# Patient Record
Sex: MALE | NOT HISPANIC OR LATINO | Employment: FULL TIME | URBAN - METROPOLITAN AREA
[De-identification: names, ages, dates, MRNs, and addresses within clinical notes are randomized per-mention and may not be internally consistent; named-entity substitution may affect disease eponyms.]

---

## 2021-11-11 ENCOUNTER — TRANSFERRED RECORDS (OUTPATIENT)
Dept: HEALTH INFORMATION MANAGEMENT | Facility: CLINIC | Age: 61
End: 2021-11-11

## 2022-12-21 ENCOUNTER — TRANSFERRED RECORDS (OUTPATIENT)
Dept: HEALTH INFORMATION MANAGEMENT | Facility: CLINIC | Age: 62
End: 2022-12-21

## 2022-12-21 LAB — TSH SERPL-ACNC: 4.46 MIU/L (ref 0.4–4.5)

## 2023-06-09 ENCOUNTER — TRANSFERRED RECORDS (OUTPATIENT)
Dept: HEALTH INFORMATION MANAGEMENT | Facility: CLINIC | Age: 63
End: 2023-06-09
Payer: COMMERCIAL

## 2023-06-13 ENCOUNTER — MEDICAL CORRESPONDENCE (OUTPATIENT)
Dept: HEALTH INFORMATION MANAGEMENT | Facility: CLINIC | Age: 63
End: 2023-06-13

## 2023-06-13 ENCOUNTER — TRANSFERRED RECORDS (OUTPATIENT)
Dept: HEALTH INFORMATION MANAGEMENT | Facility: CLINIC | Age: 63
End: 2023-06-13
Payer: COMMERCIAL

## 2023-06-14 ENCOUNTER — PATIENT OUTREACH (OUTPATIENT)
Dept: ONCOLOGY | Facility: CLINIC | Age: 63
End: 2023-06-14
Payer: COMMERCIAL

## 2023-06-14 ENCOUNTER — TRANSCRIBE ORDERS (OUTPATIENT)
Dept: OTHER | Age: 63
End: 2023-06-14

## 2023-06-14 DIAGNOSIS — J45.50 SEVERE PERSISTENT ASTHMA (H): ICD-10-CM

## 2023-06-14 DIAGNOSIS — R06.02 SOB (SHORTNESS OF BREATH): ICD-10-CM

## 2023-06-14 DIAGNOSIS — R91.8 PULMONARY NODULES: Primary | ICD-10-CM

## 2023-06-14 DIAGNOSIS — R06.89 DIFFICULTY BREATHING: ICD-10-CM

## 2023-06-14 DIAGNOSIS — J45.901 ASTHMA EXACERBATION: ICD-10-CM

## 2023-06-15 ENCOUNTER — PRE VISIT (OUTPATIENT)
Dept: ONCOLOGY | Facility: CLINIC | Age: 63
End: 2023-06-15
Payer: COMMERCIAL

## 2023-06-15 NOTE — TELEPHONE ENCOUNTER
RECORDS STATUS - ALL OTHER DIAGNOSIS    Pulmonary Nodules   RECORDS RECEIVED FROM: [facility]    Appt Date: TBD NN WQ    Action    Action Taken 6/15/2023 8:35AM KEB     I called the [facility] Phone: [phone] #7 - I was transferred to the Radiology Dept. They will push chest imaging to [place] [facility]      NOTES STATUS DETAILS   OFFICE NOTE from referring provider Johnathan Jordan MD   OFFICE NOTE from medical oncologist     DISCHARGE SUMMARY from hospital     DISCHARGE REPORT from the ER     OPERATIVE REPORT     CLINICAL TRIAL TREATMENTS TO DATE     LABS     PATHOLOGY REPORTS     ANYTHING RELATED TO DIAGNOSIS     GENONOMIC TESTING     TYPE:     IMAGING (NEED IMAGES & REPORT)     CT SCANS Requested- [facility]     MRI     MAMMO     ULTRASOUND     PET

## 2023-06-15 NOTE — PROGRESS NOTES
New Patient: Interventional Pulmonary (Lung nodule) Nurse Navigator Note    Referring provider:   Johnathan Vicente MD, at [facility]      Referred to (specialty): Interventional Pulmonary (Lung nodule)    Requested provider (if applicable): n/a    Date Referral Received: 6/14/2023    Evaluation for :  severe persistent asthma, lung nodule, asthma exacerbation, difficulty breathing, SOB    Clinical History (per Nurse review of records provided):    **BOOK MARKED**     6/9 & 6/13/2023:  CXR's--reports in chart and imaging being sent via FED EX         11/11/2021:  CT Chest WO IV Cont  [facility]  IMPRESSION:   1.  Mild groundglass opacities predominantly in the lingula and left lower lobe suggesting mild or resolving pneumonia. No larger areas of consolidation, pleural effusion, or additional complication.     2.  Stable findings suggesting tracheobronchomalacia with new area of atelectasis in the right upper lobe. No obstructive airway lesion.     3.  4 mm subpleural nodule in the left upper lobe is new from previous imaging and may be a reactive intrafissural node. Follow-up CT in 12 months could be considered although likely benign.       Records Location (Care Everywhere, Media, etc.): Epic     RECORDS NEEDED:  Last FIVE years imaging pushed to PACS from ???  Patient was referred by Johnathan Vicente MD, at [facility]--thanks!        Additional testing needed prior to consult: ?CT CHest

## 2023-06-16 NOTE — TELEPHONE ENCOUNTER
RECORDS STATUS - ALL OTHER DIAGNOSIS      Action    Action Taken 6/15/23  Imaging resolved from [facility]    LVM for pt re: recs call.    Spoke manuela/ [facility] (ph: [phone], fax: [phone]) they only have a few XRs, one done a few days ago. They cannot push to [place] [facility]. They will fax reports shortly.     [employer] Track[employer]    Spoke manuela/ [facility] - they will fax over additional records from Dr. Vicente, and include a Spirometry test done 2021. They advised only 2 XRs, rest of imaging done @ [facility]/.   10:16 AM       RECORDS RECEIVED FROM: [facility]   DATE RECEIVED:    NOTES STATUS DETAILS   OFFICE NOTE from referring provider  - [facility]: 23   DISCHARGE SUMMARY from hospital CE -  9/15/21, 10/20/20, 2/9/15   DISCHARGE REPORT from the ER [facility] 8/3/16, 16   MEDICATION LIST [facility]   PFT  & [facility] - Requested 6/16 3/12/21:     2021: [facility]   IMAGING (NEED IMAGES & REPORT)     CT SCANS PACS 21, 21, 20:    XR PACS/Requested 6/16 9/15/21, 20, 20:     Requested  - [facility]  23, 23

## 2023-06-16 NOTE — PROGRESS NOTES
"I sent the following IB message to the IP providers:  Hello,     I rec'd this \"urgent\" referral (severe persistent asthma, lung nodule, asthma exacerbation, difficulty breathing, SOB) on Wed, 6/14 from Johnathan Vicente MD, at [facility] and have been working with medical records to get his imaging.     His last CT was 11/11/2021: imaging in PACS   IMPRESSION:   1.  Mild groundglass opacities predominantly in the lingula and left lower lobe suggesting mild or resolving pneumonia. No larger areas of consolidation, pleural effusion, or additional complication.     2.  Stable findings suggesting tracheobronchomalacia with new area of atelectasis in the right upper lobe. No obstructive airway lesion.     3.  4 mm subpleural nodule in the left upper lobe is new from previous imaging and may be a reactive intrafissural node. Follow-up CT in 12 months could be considered although likely benign.         Had two recent CXR's in 6/2023--reports in chart, imaging being sent via FED EX--noting concerning     I assume we need a CT Chest and would he see IP or gen pulm and how soon?     Thank you,     Lisset Flynn, RN, BSN   Nurse Navigator   [phone]   [email or web address]     "

## 2023-06-19 ENCOUNTER — TRANSCRIBE ORDERS (OUTPATIENT)
Dept: OTHER | Age: 63
End: 2023-06-19

## 2023-06-19 ENCOUNTER — TELEPHONE (OUTPATIENT)
Dept: PULMONOLOGY | Facility: CLINIC | Age: 63
End: 2023-06-19
Payer: COMMERCIAL

## 2023-06-19 DIAGNOSIS — J45.901 ASTHMA EXACERBATION: ICD-10-CM

## 2023-06-19 DIAGNOSIS — R06.02 SOB (SHORTNESS OF BREATH): ICD-10-CM

## 2023-06-19 DIAGNOSIS — J45.909 ASTHMA: Primary | ICD-10-CM

## 2023-06-19 DIAGNOSIS — R91.8 PULMONARY NODULES: ICD-10-CM

## 2023-06-19 DIAGNOSIS — R06.89 DIFFICULTY BREATHING: ICD-10-CM

## 2023-06-19 DIAGNOSIS — J45.50 SEVERE PERSISTENT ASTHMA (H): Primary | ICD-10-CM

## 2023-06-19 NOTE — TELEPHONE ENCOUNTER
[facility] Call Center    Phone Message    May a detailed message be left on voicemail: yes     Reason for Call: Appointment Intake    Referring Provider Name: Marlen Abdalla, Urgent: 3-5 Days  Diagnosis and/or Symptoms: Severe persistent asthma, Pulmonary nodules, Asthma exacerbation, SOB (shortness of breath), Difficulty breathing.     Action Taken: Other: Pulm    Travel Screening: Not Applicable

## 2023-06-19 NOTE — PROGRESS NOTES
"IB message in return from Dr. Tao Wheatley:    \"Gen pulm, thanks\"    Referral sent to NPS (new patient scheduling) team to re transcribe to Gen Pulm.  "

## 2023-06-20 NOTE — TELEPHONE ENCOUNTER
Spoke with Dr. Vicente, referring provider, and informed him that next opening at the [facility] would be mid to late Sept. Discussed there are outreach clinics where pt could potentially be seen sooner (, [place], [place], [place], [place], and now [facility]) or else option is to page emergency consult and I could provide that contact info. He declined. Dr. Vicente requested contact info for [facility] ([phone]) and states he will have his care team work on getting patient in sooner. Informed Dr. Vicente that when calling, they should specify wanting to schedule at [facility].

## 2023-06-20 NOTE — TELEPHONE ENCOUNTER
Patient was scheduled in September. However Referral states needs to be seen within 3-5 Days.  Please reach out to get into a sooner appointment if able. Thank you.

## 2023-07-18 NOTE — CONFIDENTIAL NOTE
RECORDS RECEIVED FROM: internal /ce    DATE RECEIVED: 9.29.23    NOTES STATUS DETAILS   OFFICE NOTE from referring provider Received  Dr Johnathan [facility]   OFFICE NOTE from other specialist     DISCHARGE SUMMARY from hospital     DISCHARGE REPORT from the ER     MEDICATION LIST internal     IMAGING  (NEED IMAGES AND REPORTS)     CT SCAN ce [facility]- 11.11.21.   CHEST XRAY (CXR) ce [facility]  6.13.23, 6.9.23,     [place] 12.21.22    [facility]- 9.15.21    TESTS     PULMONARY FUNCTION TESTING (PFT) internal /received  11.11.21/received     Scheduled 9.29.23           Action 7/18/23 sv    Action Taken CXR- Image request sent to     [facility]  6.13.23, 6.9.23, 12.21.22        Action 8.29.23 sv    Action Taken 2nd CXR- Image request sent to   [facility]  6.13.23, 6.9.23, 12.21.22      Action 9.26.23 sv    Action Taken Called [facility], per [facility] imaging disc was sent, sent shipping lable

## 2023-09-29 ENCOUNTER — PRE VISIT (OUTPATIENT)
Dept: PULMONOLOGY | Facility: CLINIC | Age: 63
End: 2023-09-29

## 2023-11-27 DIAGNOSIS — J45.909 ASTHMA: Primary | ICD-10-CM

## 2023-12-13 ASSESSMENT — ASTHMA QUESTIONNAIRES: ACT_TOTALSCORE: 10

## 2023-12-15 ENCOUNTER — OFFICE VISIT (OUTPATIENT)
Dept: PULMONOLOGY | Facility: CLINIC | Age: 63
End: 2023-12-15
Payer: COMMERCIAL

## 2023-12-15 VITALS
DIASTOLIC BLOOD PRESSURE: 79 MMHG | OXYGEN SATURATION: 98 % | HEART RATE: 88 BPM | SYSTOLIC BLOOD PRESSURE: 154 MMHG | HEIGHT: 66 IN | WEIGHT: 162.4 LBS | BODY MASS INDEX: 26.1 KG/M2

## 2023-12-15 DIAGNOSIS — Z91.09 ENVIRONMENTAL ALLERGIES: ICD-10-CM

## 2023-12-15 DIAGNOSIS — R91.8 PULMONARY NODULES: ICD-10-CM

## 2023-12-15 DIAGNOSIS — J45.50 SEVERE PERSISTENT ASTHMA WITHOUT COMPLICATION (H): Primary | ICD-10-CM

## 2023-12-15 DIAGNOSIS — J39.8 TRACHEOBRONCHOMALACIA: ICD-10-CM

## 2023-12-15 DIAGNOSIS — J45.909 ASTHMA: ICD-10-CM

## 2023-12-15 LAB — HGB BLD-MCNC: 13.2 G/DL

## 2023-12-15 PROCEDURE — 94375 RESPIRATORY FLOW VOLUME LOOP: CPT | Performed by: INTERNAL MEDICINE

## 2023-12-15 PROCEDURE — 94729 DIFFUSING CAPACITY: CPT | Performed by: INTERNAL MEDICINE

## 2023-12-15 PROCEDURE — 85018 HEMOGLOBIN: CPT | Mod: QW | Performed by: INTERNAL MEDICINE

## 2023-12-15 PROCEDURE — 94726 PLETHYSMOGRAPHY LUNG VOLUMES: CPT | Performed by: INTERNAL MEDICINE

## 2023-12-15 PROCEDURE — 99204 OFFICE O/P NEW MOD 45 MIN: CPT | Performed by: NURSE PRACTITIONER

## 2023-12-15 RX ORDER — BUDESONIDE AND FORMOTEROL FUMARATE DIHYDRATE 160; 4.5 UG/1; UG/1
2 AEROSOL RESPIRATORY (INHALATION) 2 TIMES DAILY
Qty: 10.2 G | Refills: 11 | Status: SHIPPED | OUTPATIENT
Start: 2023-12-15

## 2023-12-15 RX ORDER — LOSARTAN POTASSIUM 100 MG/1
100 TABLET ORAL
COMMUNITY
Start: 2023-07-27

## 2023-12-15 RX ORDER — CLONIDINE 0.1 MG/24H
PATCH, EXTENDED RELEASE TRANSDERMAL
COMMUNITY
Start: 2023-10-20 | End: 2024-01-15 | Stop reason: DRUGHIGH

## 2023-12-15 RX ORDER — SIMVASTATIN 20 MG
20 TABLET ORAL
COMMUNITY
Start: 2022-02-03

## 2023-12-15 RX ORDER — FLUTICASONE PROPIONATE 50 MCG
SPRAY, SUSPENSION (ML) NASAL
COMMUNITY
Start: 2022-04-11

## 2023-12-15 RX ORDER — AMITRIPTYLINE HYDROCHLORIDE 10 MG/1
20 TABLET ORAL
COMMUNITY

## 2023-12-15 RX ORDER — IPRATROPIUM BROMIDE AND ALBUTEROL SULFATE 2.5; .5 MG/3ML; MG/3ML
3 SOLUTION RESPIRATORY (INHALATION)
COMMUNITY

## 2023-12-15 RX ORDER — PRAVASTATIN SODIUM 20 MG
20 TABLET ORAL
COMMUNITY

## 2023-12-15 RX ORDER — ALBUTEROL SULFATE 90 UG/1
AEROSOL, METERED RESPIRATORY (INHALATION)
COMMUNITY
Start: 2023-10-09

## 2023-12-15 RX ORDER — CHLORAL HYDRATE 500 MG
1000 CAPSULE ORAL
COMMUNITY

## 2023-12-15 RX ORDER — DIPHENOXYLATE HYDROCHLORIDE AND ATROPINE SULFATE 2.5; .025 MG/1; MG/1
1 TABLET ORAL DAILY
COMMUNITY

## 2023-12-15 RX ORDER — QUINIDINE GLUCONATE 324 MG
240 TABLET, EXTENDED RELEASE ORAL
COMMUNITY
Start: 2022-02-03

## 2023-12-15 RX ORDER — BUPROPION HCL 150 MG
150 TABLET, EXTENDED RELEASE 24 HR ORAL
COMMUNITY
Start: 2023-11-01

## 2023-12-15 RX ORDER — BLOOD SUGAR DIAGNOSTIC
STRIP MISCELLANEOUS
COMMUNITY
Start: 2023-10-06

## 2023-12-15 RX ORDER — INSULIN LISPRO 100 [IU]/ML
INJECTION, SOLUTION INTRAVENOUS; SUBCUTANEOUS
COMMUNITY
Start: 2022-11-25

## 2023-12-15 RX ORDER — ASPIRIN 81 MG/1
1 TABLET ORAL DAILY
COMMUNITY

## 2023-12-15 RX ORDER — AMLODIPINE BESYLATE 5 MG/1
5 TABLET ORAL
COMMUNITY

## 2023-12-15 NOTE — PROGRESS NOTES
Pulmonary Clinic Consultation          Assessment/Plan:     63 year old male with a history of asthma, CKD, DM I, peripheral neuropathy, HLD, HTN, hypothyroidism, retinopathy, seasonal allergies, presenting for evaluation of asthma.      Severe persistent asthma  Tracheobronchomalacia   Environmental allergies  Pulmonary nodules  Lifelong non-smoker presents for evaluation of severe asthma.  Hx of environmental allergies requiring allergy testing, but he is unsure of exact results.  Reports asthma exacerbations about 3 times/year.  He has been out of work since August due to his breathing and neuropathy, which has had benefit for his breathing.  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  Pulmonary function testing today shows severe airway obstruction (FEV1 36% predicted), and normal diffusion capacity.  Some results failed to meet ATS criteria, with variable results.  Patient declined Pleth testing.  Chest CTs in 2021 have shown evidence of tracheobronchomalacia per radiology reports, but I do not have these images available to me today.  FeNO in clinic today 9.  Plan:  - high resolution chest CT, to evaluate for tracheobronchomalacia and monitor past nodule.  May need bronchoscopy for airway visualization.  - increase asthma regimen:  stop Qvar, start Symbicort (budesonide/formoterol) 160/4.5, two puffs BID, rinse/gargle after use.  - continue Albuterol inhaler or Duonebs PRN  - start Singulair 10mg daily  - [facility] Allergen Panel, IgE, CBC w/diff  - provided him with peak flow meter and written out action plan.  - Action plan: he prefers not to take prednisone if at all possible, due to high BGs (DM I).  He is usually given azithromycin with benefit.  Can offer lower dose of prednisone 20mg x5-7 days.  - he is due for COVID vaccines, annual influenza vaccine, and pneumococcal vaccine.  He declines vaccination today.    Follow-up  - one month      Maura Matta CNP  Pulmonary Medicine  [facility]  [facility]  [phone]       CC:     Asthma evaluation     HPI:     63 year old male with a history of asthma, CKD, DM I, peripheral neuropathy, HLD, HTN, hypothyroidism, retinopathy, seasonal allergies, presenting for evaluation of asthma.      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  Has worked in loan environments most of life.  Chest tightness, coughing, shortness of breath.  Given azithromycin for these episodes, cleared up.  Three times/year has episodes, winter x2 and then fall (ragweed).  Steroids mess with blood sugar, so only antibiotics.  Episodes after nose running.  Given albuterol in early 2000s, did find benefit.  Breo Ellipta, didn't notice much benefit.  Switched to Qvar, very beneficial, helps bring up mucous.  Not having as many episodes.  Has been out of work since August.  [place] windows, inhalational exposures, very loan, using saw.  Worse breathing.  Stopping worked helped breathing greatly.  Using albuterol once/month when feeling well, then increases during episodes.  Duonebs only during episodes.  Had allergy testing, tree pollen, ragweed, 3 or 4 different types of trees.  Has sinus congestion and drainage.  Flonase once daily, beneficial.  On singulair 10mg, breathing did improve with this.  Having issues with neuropathy.  Lifelong non-smoker.  No vaping, marijuana use.  Has had PFTs before.    Medical records reviewed for this visit include primary care provider notes.        12/13/2023     2:53 PM   ACT Total Scores   ACT TOTAL SCORE (Goal Greater than or Equal to 20) 10   In the past 12 months, how many times did you visit the emergency room for your asthma without being admitted to the hospital? 0   In the past 12 months, how many times were you hospitalized overnight because of your asthma? 0        ROS:     A 12-system review was obtained and was negative with the exception of the symptoms endorsed in the HPI.       Medical history:       PMH:  No  past medical history on file.    PSH:  No past surgical history on file.    Allergies:  Allergies   Allergen Reactions    Levofloxacin GI Disturbance, Nausea and Other (See Comments)     Upset stomach    Poliomyelitis Vaccine, Inactivated      Patient is diabetic and thinks it could be due to that    Doxycycline GI Disturbance, Nausea and Vomiting and Other (See Comments)     VOMITING       Family Hx:  No family history on file.    Social Hx:  Social History     Socioeconomic History    Marital status: Single     Spouse name: Not on file    Number of children: Not on file    Years of education: Not on file    Highest education level: Not on file   Occupational History    Not on file   Tobacco Use    Smoking status: Never    Smokeless tobacco: Never   Vaping Use    Vaping Use: Never used   Substance and Sexual Activity    Alcohol use: Not on file    Drug use: Not on file    Sexual activity: Not on file   Other Topics Concern    Not on file   Social History Narrative    Not on file     Social Determinants of Health     Financial Resource Strain: Not on file   Food Insecurity: Not on file   Transportation Needs: Not on file   Physical Activity: Not on file   Stress: Not on file   Social Connections: Not on file   Interpersonal Safety: Not on file   Housing Stability: Not on file       Current Meds:  Current Outpatient Medications   Medication Sig Dispense Refill    ACCU-CHEK GUIDE test strip       albuterol (PROAIR HFA/PROVENTIL HFA/VENTOLIN HFA) 108 (90 Base) MCG/ACT inhaler       amitriptyline (ELAVIL) 10 MG tablet Take 20 mg by mouth      amLODIPine (NORVASC) 5 MG tablet Take 5 mg by mouth      aspirin 81 MG EC tablet Take 1 tablet by mouth daily      beclomethasone HFA (QVAR REDIHALER) 80 MCG/ACT inhaler Inhale 80 mcg into the lungs      budesonide-formoterol (SYMBICORT) 160-4.5 MCG/ACT Inhaler Inhale 2 puffs into the lungs 2 times daily 10.2 g 11    cloNIDine (CATAPRES-TTS1) 0.1 MG/24HR WK patch 1 patch(es),  "Topical, qweek, # 12 patch(es), 3 Refill(s), Type: Maintenance, Pharmacy: [facility], 1 patch(es) Topical qweek, 67, in, 06/25/23 13:56:00 CDT, Height Measured, 157.4, lb, 10/20/23 14:14:00 CDT, Weight Measured      Ferrous Gluconate (FERGON) 240 (27 Fe) MG TABS Take 240 mg by mouth      fish oil-omega-3 fatty acids 1000 MG capsule Take 1,000 mg by mouth      fluticasone (FLONASE) 50 MCG/ACT nasal spray = 2 spray(s), Nasal, daily, # 16 gm, 11 Refill(s), Type: Maintenance, Pharmacy: [facility], 2 spray(s) Nasal daily, 67, in, 02/14/22 12:56:00 CST, Height Measured, 173, lb, 02/14/22 12:56:00 CST, Weight Measured      HUMALOG 100 UNIT/ML injection See Instructions, Instructions: INJECT 35 UNITS DAILY VIA PUMP, # 50 mL, 0 Refill(s), Type: Maintenance, Pharmacy: [facility], INJECT 35 UNITS DAILY VIA PUMP, 67, in, 06/25/23 13:56:00 CDT, Height Measured, 154.6, lb, 07/17/23 14:06:00 CDT, Weight Measured      ipratropium - albuterol 0.5 mg/2.5 mg/3 mL (DUONEB) 0.5-2.5 (3) MG/3ML neb solution Inhale 3 mLs into the lungs      losartan (COZAAR) 100 MG tablet Take 100 mg by mouth      Multiple Vitamin (MULTI-VITAMINS) TABS Take 1 tablet by mouth daily      pravastatin (PRAVACHOL) 20 MG tablet Take 20 mg by mouth      simvastatin (ZOCOR) 20 MG tablet Take 20 mg by mouth      SINGULAIR 10 MG tablet Take 1 tablet (10 mg) by mouth at bedtime 30 tablet 11    TURMERIC PO Take by mouth daily      WELLBUTRIN  MG 24 hr tablet Take 150 mg by mouth            Physical Exam:     BP (!) 154/79 (BP Location: Left arm, Patient Position: Sitting, Cuff Size: Adult Regular)   Pulse 88   Ht 1.664 m (5' 5.5\")   Wt 73.7 kg (162 lb 6.4 oz)   SpO2 98%   BMI 26.61 kg/m    Gen: adult male , appears in NAD  HEENT: clear conjunctivae, moist mucous membranes  CV: RRR, no M/G/R  Resp: expiratory wheezes throughout.  Respirations even and unlabored.  On RA.   No stridor noted upon " auscultation of neck.  Skin: no apparent rashes on visible skin  Ext: no cyanosis, clubbing or edema  Neuro: alert and answering questions appropriately       Data:     Labs:  reviewed    Imaging studies:  I have personally reviewed all pertinent imaging studies and PFT results unless otherwise noted.      EXAM: CT CHEST WO IV CONT   LOCATION: [facility]   DATE/TIME: 11/11/2021   FINDINGS:   LUNGS AND PLEURA: The airways remain markedly flattened and stable, suggesting tracheobronchomalacia. There is mild consolidation with volume loss in the right upper lobe inferiorly and anteriorly suggesting atelectasis. No obstructive airway lesion is seen. There are several mild patchy and confluent groundglass densities predominantly within the lingula and left lower lobe anteriorly as well as a small area within the right lower lobe in the lung base which could be due to pneumonia or resolving pneumonia. No additional consolidation or pleural effusion. 4 mm subpleural nodule in the left upper lobe posteriorly along the major fissure which could be intrafissural node and new on series 3, image #52. No suspicious pulmonary nodules per   MEDIASTINUM/AXILLAE: The heart is normal in size. The thoracic aorta is nonaneurysmal. No suspicious adenopathy.   CORONARY ARTERY CALCIFICATION: Moderate.   UPPER ABDOMEN: 2 mm nonobstructive left renal calculus. No suspicious findings.   MUSCULOSKELETAL: Mild degenerative changes of the spine.   IMPRESSION:   1.  Mild groundglass opacities predominantly in the lingula and left lower lobe suggesting mild or resolving pneumonia. No larger areas of consolidation, pleural effusion, or additional complication.   2.  Stable findings suggesting tracheobronchomalacia with new area of atelectasis in the right upper lobe. No obstructive airway lesion.   3.  4 mm subpleural nodule in the left upper lobe is new from previous imaging and may be a reactive intrafissural node. Follow-up CT  in 12 months could be considered although likely benign.       EXAM: CT CHEST WO IV CONT HI-RES   LOCATION: [facility]   DATE/TIME: 2/8/2021  FINDINGS:   LUNGS AND PLEURA: AP diameter of the trachea is 1.7 cm in inspiration and 0.9 cm in expiration with significant bowing of the anterior tracheal cartilage and near complete occlusion of the lumens of the mainstem bronchi bilaterally during expiration. Benign variant right lower lobe inferior accessory fissure. No reticulation, bronchiectasis, honeycombing, or evidence of gas trapping.   MEDIASTINUM/AXILLAE: Moderate calcified three-vessel coronary atherosclerosis and/or stents. Small fat-containing right Bochdalek hernia.   UPPER ABDOMEN: Tiny nonobstructing bilateral kidney stones.   MUSCULOSKELETAL: Marked hypertrophic change thoracic spine.   IMPRESSION:   1.  Abnormally compliant tracheal and bronchial cartilage, suspicious for tracheobronchomalacia.   2.  No evidence of interstitial lung disease       Pulmonary Function Testing    12/15/23:

## 2023-12-15 NOTE — PATIENT INSTRUCTIONS
It was a pleasure to see you in clinic today.   Here is what we discussed:    Stop Qvar, start Symbicort two puffs twice daily, rinse/gargle after use.  Continue Albuterol inhaler or Duonebs every 4-6 hours as needed for shortness of breath or wheezing.  Have your blood work done, to check for reactivity to allergens.  We will perform a chest CT, to check your airways and monitor the past lung nodule.  Call my nurse, Jayson ([phone]) with any change or worsening of your breathing.  We can then activate your 'action plan', starting with azithromycin and consider steroids.  Increase your duonebs when you are exacerbating/ill.  Follow-up in one month.    Maura Matta, CNP  Pulmonary Medicine  [facility]  [phone]

## 2023-12-16 LAB
DLCOCOR-%PRED-PRE: 95 %
DLCOCOR-PRE: 22.87 ML/MIN/MMHG
DLCOUNC-%PRED-PRE: 91 %
DLCOUNC-PRE: 21.91 ML/MIN/MMHG
DLCOUNC-PRED: 23.85 ML/MIN/MMHG
ERV-%PRED-PRE: 41 %
ERV-PRE: 0.55 L
ERV-PRED: 1.31 L
EXPTIME-PRE: 8.29 SEC
FEF2575-%PRED-PRE: 18 %
FEF2575-PRE: 0.44 L/SEC
FEF2575-PRED: 2.44 L/SEC
FEFMAX-%PRED-PRE: 26 %
FEFMAX-PRE: 2.16 L/SEC
FEFMAX-PRED: 8.1 L/SEC
FEV1-%PRED-PRE: 36 %
FEV1-PRE: 1.03 L
FEV1FEV6-PRE: 52 %
FEV1FEV6-PRED: 79 %
FEV1FVC-PRE: 49 %
FEV1FVC-PRED: 79 %
FEV1SVC-PRE: 40 %
FEV1SVC-PRED: 72 %
FIFMAX-PRE: 2.53 L/SEC
FVC-%PRED-PRE: 58 %
FVC-PRE: 2.08 L
FVC-PRED: 3.58 L
IC-%PRED-PRE: 76 %
IC-PRE: 2 L
IC-PRED: 2.62 L
VA-%PRED-PRE: 74 %
VA-PRE: 4.14 L
VC-%PRED-PRE: 64 %
VC-PRE: 2.55 L
VC-PRED: 3.95 L

## 2023-12-31 ENCOUNTER — HEALTH MAINTENANCE LETTER (OUTPATIENT)
Age: 63
End: 2023-12-31

## 2024-01-05 ENCOUNTER — LAB (OUTPATIENT)
Dept: LAB | Facility: CLINIC | Age: 64
End: 2024-01-05
Attending: NURSE PRACTITIONER
Payer: COMMERCIAL

## 2024-01-05 ENCOUNTER — HOSPITAL ENCOUNTER (OUTPATIENT)
Dept: CT IMAGING | Facility: CLINIC | Age: 64
Discharge: HOME | End: 2024-01-05
Attending: NURSE PRACTITIONER
Payer: COMMERCIAL

## 2024-01-05 DIAGNOSIS — Z91.09 ENVIRONMENTAL ALLERGIES: ICD-10-CM

## 2024-01-05 DIAGNOSIS — J39.8 TRACHEOBRONCHOMALACIA: ICD-10-CM

## 2024-01-05 DIAGNOSIS — J45.50 SEVERE PERSISTENT ASTHMA WITHOUT COMPLICATION (H): ICD-10-CM

## 2024-01-05 LAB
BASOPHILS # BLD AUTO: 0.1 10E3/UL (ref 0–0.2)
BASOPHILS NFR BLD AUTO: 1 %
EOSINOPHIL # BLD AUTO: 0.2 10E3/UL (ref 0–0.7)
EOSINOPHIL NFR BLD AUTO: 2 %
ERYTHROCYTE [DISTWIDTH] IN BLOOD BY AUTOMATED COUNT: 13 % (ref 10–15)
HCT VFR BLD AUTO: 42.3 % (ref 40–53)
HGB BLD-MCNC: 13.5 G/DL (ref 13.3–17.7)
IMM GRANULOCYTES # BLD: 0.1 10E3/UL
IMM GRANULOCYTES NFR BLD: 1 %
LYMPHOCYTES # BLD AUTO: 1 10E3/UL (ref 0.8–5.3)
LYMPHOCYTES NFR BLD AUTO: 11 %
MCH RBC QN AUTO: 30 PG (ref 26.5–33)
MCHC RBC AUTO-ENTMCNC: 31.9 G/DL (ref 31.5–36.5)
MCV RBC AUTO: 94 FL (ref 78–100)
MONOCYTES # BLD AUTO: 0.7 10E3/UL (ref 0–1.3)
MONOCYTES NFR BLD AUTO: 8 %
NEUTROPHILS # BLD AUTO: 7 10E3/UL (ref 1.6–8.3)
NEUTROPHILS NFR BLD AUTO: 77 %
NRBC # BLD AUTO: 0 10E3/UL
NRBC BLD AUTO-RTO: 0 /100
PLATELET # BLD AUTO: 294 10E3/UL (ref 150–450)
RBC # BLD AUTO: 4.5 10E6/UL (ref 4.4–5.9)
WBC # BLD AUTO: 9.1 10E3/UL (ref 4–11)

## 2024-01-05 PROCEDURE — 71250 CT THORAX DX C-: CPT

## 2024-01-05 PROCEDURE — 85025 COMPLETE CBC W/AUTO DIFF WBC: CPT

## 2024-01-05 PROCEDURE — 82785 ASSAY OF IGE: CPT

## 2024-01-05 PROCEDURE — 36415 COLL VENOUS BLD VENIPUNCTURE: CPT

## 2024-01-07 LAB
A ALTERNATA IGE QN: 1.13 KU(A)/L
A FUMIGATUS IGE QN: <0.1 KU(A)/L
BERMUDA GRASS IGE QN: <0.1 KU(A)/L
C HERBARUM IGE QN: <0.1 KU(A)/L
CAT DANDER IGG QN: <0.1 KU(A)/L
CEDAR IGE QN: <0.1 KU(A)/L
COMMON RAGWEED IGE QN: 1.79 KU(A)/L
COTTONWOOD IGE QN: <0.1 KU(A)/L
D FARINAE IGE QN: <0.1 KU(A)/L
D PTERONYSS IGE QN: <0.1 KU(A)/L
DOG DANDER+EPITH IGE QN: <0.1 KU(A)/L
IGE SERPL-ACNC: 16 KU/L (ref 0–114)
MAPLE IGE QN: <0.1 KU(A)/L
MARSH ELDER IGE QN: 0.11 KU(A)/L
MOUSE URINE PROT IGE QN: <0.1 KU(A)/L
NETTLE IGE QN: <0.1 KU(A)/L
P NOTATUM IGE QN: <0.1 KU(A)/L
ROACH IGE QN: <0.1 KU(A)/L
SALTWORT IGE QN: <0.1 KU(A)/L
SILVER BIRCH IGE QN: 0.14 KU(A)/L
TIMOTHY IGE QN: <0.1 KU(A)/L
WHITE ASH IGE QN: <0.1 KU(A)/L
WHITE ELM IGE QN: <0.1 KU(A)/L
WHITE MULBERRY IGE QN: <0.1 KU(A)/L
WHITE OAK IGE QN: 0.14 KU(A)/L

## 2024-01-15 ENCOUNTER — OFFICE VISIT (OUTPATIENT)
Dept: PULMONOLOGY | Facility: CLINIC | Age: 64
End: 2024-01-15
Attending: NURSE PRACTITIONER
Payer: COMMERCIAL

## 2024-01-15 VITALS
WEIGHT: 165.2 LBS | HEART RATE: 89 BPM | BODY MASS INDEX: 27.07 KG/M2 | SYSTOLIC BLOOD PRESSURE: 151 MMHG | DIASTOLIC BLOOD PRESSURE: 88 MMHG | OXYGEN SATURATION: 97 %

## 2024-01-15 DIAGNOSIS — J45.40 MODERATE PERSISTENT ASTHMA WITHOUT COMPLICATION: Primary | ICD-10-CM

## 2024-01-15 DIAGNOSIS — J39.8 TRACHEOBRONCHOMALACIA: ICD-10-CM

## 2024-01-15 DIAGNOSIS — Z91.09 ENVIRONMENTAL ALLERGIES: ICD-10-CM

## 2024-01-15 DIAGNOSIS — R91.8 PULMONARY NODULES: ICD-10-CM

## 2024-01-15 PROCEDURE — 99213 OFFICE O/P EST LOW 20 MIN: CPT | Performed by: NURSE PRACTITIONER

## 2024-01-15 RX ORDER — CLONIDINE 0.2 MG/24H
PATCH, EXTENDED RELEASE TRANSDERMAL
COMMUNITY
Start: 2023-12-29

## 2024-01-15 ASSESSMENT — ASTHMA QUESTIONNAIRES
QUESTION_4 LAST FOUR WEEKS HOW OFTEN HAVE YOU USED YOUR RESCUE INHALER OR NEBULIZER MEDICATION (SUCH AS ALBUTEROL): ONE OR TWO TIMES PER DAY
ACT_TOTALSCORE: 12
QUESTION_5 LAST FOUR WEEKS HOW WOULD YOU RATE YOUR ASTHMA CONTROL: POORLY CONTROLLED
ACT_TOTALSCORE: 12
QUESTION_3 LAST FOUR WEEKS HOW OFTEN DID YOUR ASTHMA SYMPTOMS (WHEEZING, COUGHING, SHORTNESS OF BREATH, CHEST TIGHTNESS OR PAIN) WAKE YOU UP AT NIGHT OR EARLIER THAN USUAL IN THE MORNING: NOT AT ALL
QUESTION_2 LAST FOUR WEEKS HOW OFTEN HAVE YOU HAD SHORTNESS OF BREATH: MORE THAN ONCE A DAY
QUESTION_1 LAST FOUR WEEKS HOW MUCH OF THE TIME DID YOUR ASTHMA KEEP YOU FROM GETTING AS MUCH DONE AT WORK, SCHOOL OR AT HOME: MOST OF THE TIME

## 2024-01-15 NOTE — PATIENT INSTRUCTIONS
It was a pleasure to see you in clinic today.   Here is what we discussed:    Continue Symbicort two puffs twice daily, rinse/gargle after use.  Continue Albuterol or Duonebs every 4-6 hours as needed for shortness of breath or wheezing.  Continue Singulair 10mg daily.  Call my nurse, Jayson ([phone]) with any change or worsening of your breathing.  Follow-up with Dr Mercedes next opening.    Maura Matta, CNP  Pulmonary Medicine  [facility]  [phone]

## 2024-02-05 ENCOUNTER — OFFICE VISIT (OUTPATIENT)
Dept: PULMONOLOGY | Facility: CLINIC | Age: 64
End: 2024-02-05
Attending: NURSE PRACTITIONER
Payer: COMMERCIAL

## 2024-02-05 VITALS
HEART RATE: 86 BPM | SYSTOLIC BLOOD PRESSURE: 138 MMHG | OXYGEN SATURATION: 95 % | BODY MASS INDEX: 27.76 KG/M2 | WEIGHT: 169.4 LBS | DIASTOLIC BLOOD PRESSURE: 86 MMHG

## 2024-02-05 DIAGNOSIS — J39.8 TRACHEOBRONCHOMALACIA: ICD-10-CM

## 2024-02-05 DIAGNOSIS — J20.9 ACUTE BRONCHITIS, UNSPECIFIED ORGANISM: ICD-10-CM

## 2024-02-05 DIAGNOSIS — J45.51 SEVERE PERSISTENT ASTHMA WITH ACUTE EXACERBATION (H): ICD-10-CM

## 2024-02-05 DIAGNOSIS — R05.3 CHRONIC COUGH: ICD-10-CM

## 2024-02-05 DIAGNOSIS — J45.41 MODERATE PERSISTENT ASTHMA WITH EXACERBATION: Primary | ICD-10-CM

## 2024-02-05 DIAGNOSIS — Z91.09 ENVIRONMENTAL ALLERGIES: ICD-10-CM

## 2024-02-05 DIAGNOSIS — J45.40 MODERATE PERSISTENT ASTHMA WITHOUT COMPLICATION: ICD-10-CM

## 2024-02-05 PROCEDURE — 99214 OFFICE O/P EST MOD 30 MIN: CPT | Performed by: INTERNAL MEDICINE

## 2024-02-05 RX ORDER — AZITHROMYCIN 250 MG/1
TABLET, FILM COATED ORAL
Qty: 6 TABLET | Refills: 0 | Status: SHIPPED | OUTPATIENT
Start: 2024-02-05 | End: 2024-02-10

## 2024-02-05 RX ORDER — PREDNISONE 10 MG/1
TABLET ORAL
Qty: 30 TABLET | Refills: 0 | Status: SHIPPED | OUTPATIENT
Start: 2024-02-05

## 2024-02-05 ASSESSMENT — ASTHMA QUESTIONNAIRES
QUESTION_2 LAST FOUR WEEKS HOW OFTEN HAVE YOU HAD SHORTNESS OF BREATH: MORE THAN ONCE A DAY
QUESTION_3 LAST FOUR WEEKS HOW OFTEN DID YOUR ASTHMA SYMPTOMS (WHEEZING, COUGHING, SHORTNESS OF BREATH, CHEST TIGHTNESS OR PAIN) WAKE YOU UP AT NIGHT OR EARLIER THAN USUAL IN THE MORNING: ONCE A WEEK
QUESTION_4 LAST FOUR WEEKS HOW OFTEN HAVE YOU USED YOUR RESCUE INHALER OR NEBULIZER MEDICATION (SUCH AS ALBUTEROL): THREE OR MORE TIMES PER DAY
ACT_TOTALSCORE: 9
ACT_TOTALSCORE: 9
QUESTION_5 LAST FOUR WEEKS HOW WOULD YOU RATE YOUR ASTHMA CONTROL: POORLY CONTROLLED
QUESTION_1 LAST FOUR WEEKS HOW MUCH OF THE TIME DID YOUR ASTHMA KEEP YOU FROM GETTING AS MUCH DONE AT WORK, SCHOOL OR AT HOME: MOST OF THE TIME

## 2024-02-05 NOTE — PROGRESS NOTES
PULMONARY OUTPATIENT FOLLOW UP NOTE        Assessment:      Severe persistent asthma with acute exacerbation   Recent lung function test showed a severe obstructive lung disease. Normal diffusion capacity.   Previous labs showed no eosinophilia, normal IgE level 16, mils reactivity to mold, silver birch, marshelder, oak, ragweed.  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   Steroid taper .   Recurrent asthma exacerbation over the last few months, underlying acid reflux could be playing a role. Recommend to add PPI treatment.   Continue ICS/LABA, albuterol / duonebs as needed.  Acute bronchitis   Zpack  Tracheobronchomalacia  Tracheobronchomalacia noted in chest CT scan  Will consider a diagnostic bronchoscopy for further evaluation, currently with symptoms of asthma exacerbation. Will hold procedure for now.   Recommend to add PPI therapy. .   Resolution of previously described lung nodules      Plan:      Steroid taper   Azithromycin   Continue symbicort 2 puffs twice a day, rinse your mouth with water after each use  Albuterol HFA or DUONEBS every 6 hours as needed  Continue singulair  Avoid eating close to bedtime, at least three hours   Raise the head of the bed  Start omeprazole one tab daily   Give a call in two weeks to let me know how are you doing, clinic number [phone], nurse Jayson  Follow up in 3 months         Pedro Bruce  Pulmonary / Critical Care  February 5, 2024        CC:     Chief Complaint   Patient presents with    Follow Up     with physician next opening, to address tracheobronchomalacia    Moderate persistent asthma  Tracheobronchomalacia   Environmental allergies  Pulmonary nodules           HPI:      Ermias Escamilla is a 63 year old male who presents to [facility] care.  Patient has history of asthma, tracheobronchomalacia, HTN, DM1, and CKD stage 3.  Patient was seen last by ROBY Matta, pulmonary NP for evaluation of asthma and lung nodules on 1/5/2024.   A follow up  chest CT scan was ordered.    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   Patient had a recent allergy panel. Patient is currently on ICS/LABA but persistent respiratory symptoms. Patient was previously on QVAR and singulair.   Reports exertional dyspnea , able to walk 3 before stopping. Worse over the last few weeks. Able to climb one flight of stairs.   Reports on/off wheezes.   Dry cough, occasionally productive cough. Cough is slightly worse in AM.   Denies hemoptysis.   Denies fever, chills or night sweats.   Reports postnasal drip, denies headaches, denies sinus tenderness. Uses nasal steroid.  Uses LABA/ICS and albuterol inhaler or duonebs..   Denies chest pain, orthopnea, PND, or swelling of LEs  Denies acid reflux symptoms, no swallowing problems.   Denies sleep problems, ? Snoring, refresh in AM.   Denies tobacco use.        Past Medical History :     Asthma, tracheobronchomalacia, HTN, DM1, and CKD stage 3     Medications:     ACCU-CHEK GUIDE test strip,   albuterol (PROAIR HFA/PROVENTIL HFA/VENTOLIN HFA) 108 (90 Base) MCG/ACT inhaler,   amitriptyline (ELAVIL) 10 MG tablet, Take 20 mg by mouth  amLODIPine (NORVASC) 5 MG tablet, Take 5 mg by mouth  aspirin 81 MG EC tablet, Take 1 tablet by mouth daily  budesonide-formoterol (SYMBICORT) 160-4.5 MCG/ACT Inhaler, Inhale 2 puffs into the lungs 2 times daily  Cholecalciferol (VITAMIN D3 PO), Take by mouth daily  cloNIDine (CATAPRES-TTS2) 0.2 MG/24HR WK patch,   Ferrous Gluconate (FERGON) 240 (27 Fe) MG TABS, Take 240 mg by mouth  fish oil-omega-3 fatty acids 1000 MG capsule, Take 1,000 mg by mouth  fluticasone (FLONASE) 50 MCG/ACT nasal spray, = 2 spray(s), Nasal, daily, # 16 gm, 11 Refill(s), Type: Maintenance, Pharmacy: [facility], 2 spray(s) Nasal daily, 67, in, 02/14/22 12:56:00 CST, Height Measured, 173, lb, 02/14/22 12:56:00 CST, Weight Measured  HUMALOG 100 UNIT/ML injection, See Instructions,  Instructions: INJECT 35 UNITS DAILY VIA PUMP, # 50 mL, 0 Refill(s), Type: Maintenance, Pharmacy: [facility], INJECT 35 UNITS DAILY VIA PUMP, 67, in, 06/25/23 13:56:00 CDT, Height Measured, 154.6, lb, 07/17/23 14:06:00 CDT, Weight Measured  ipratropium - albuterol 0.5 mg/2.5 mg/3 mL (DUONEB) 0.5-2.5 (3) MG/3ML neb solution, Inhale 3 mLs into the lungs  losartan (COZAAR) 100 MG tablet, Take 100 mg by mouth  Multiple Vitamin (MULTI-VITAMINS) TABS, Take 1 tablet by mouth daily  pravastatin (PRAVACHOL) 20 MG tablet, Take 20 mg by mouth  simvastatin (ZOCOR) 20 MG tablet, Take 20 mg by mouth  SINGULAIR 10 MG tablet, Take 1 tablet (10 mg) by mouth at bedtime  TURMERIC PO, Take by mouth daily  WELLBUTRIN  MG 24 hr tablet, Take 150 mg by mouth    No current facility-administered medications on file prior to visit.    Social History :     Social History     Socioeconomic History    Marital status: Single     Spouse name: Not on file    Number of children: Not on file    Years of education: Not on file    Highest education level: Not on file   Occupational History    Not on file   Tobacco Use    Smoking status: Never    Smokeless tobacco: Never   Vaping Use    Vaping Use: Never used   Substance and Sexual Activity    Alcohol use: Not on file    Drug use: Not on file    Sexual activity: Not on file   Other Topics Concern    Not on file   Social History Narrative    Not on file     Social Determinants of Health     Financial Resource Strain: Not on file   Food Insecurity: Not on file   Transportation Needs: Not on file   Physical Activity: Not on file   Stress: Not on file   Social Connections: Not on file   Interpersonal Safety: Not on file   Housing Stability: Not on file          Family History :     No family history on file.    Review of Systems  A 12 point comprehensive review of systems was negative except as noted.        Objective:     /86 (BP Location: Right arm, Patient Position: Sitting,  Cuff Size: Adult Regular)   Pulse 86   Wt 76.8 kg (169 lb 6.4 oz)   SpO2 95%   BMI 27.76 kg/m        Gen: awake, alert, no distress  HEENT: pink conjunctiva, moist mucosa, Mallampati II/IV  Neck: no thyromegaly, masses or JVD  Lungs: wheezes both HT  CV: regular, no murmurs or gallops appreciated  Abdomen: soft, NT, BS wnl  Ext: no edema  Neuro: CN II-XII intact, non focal      Diagnostic tests:       01/05/24 14:13   Allergen A alternata 1.13 (H)   Allergen A fumigatus <0.10   Allergen, [place] Grass <0.10   Allergen C herbarum <0.10   Allergen Cat Dander <0.10   Allergen Cockroach <0.10   Allergen D farinae <0.10   Allergen, D Pteronyssinus <0.10   Allergen Dog Dander <0.10   Allergen Elm <0.10   Allergen Maple <0.10   Allergen, Mtn [place] <0.10   Allergen Oak(white) 0.14 (H)   Allergen P notatum <0.10   Allergen Manjit <0.10   Allergen Tree [place] IgE <0.10   Allergen Weed Nettle IgE <0.10   Allergen [place] <0.10   Allergen Marshelder 0.11 (H)   Allergen, Mouse Urine <0.10   Allergen, Ragweed Short 1.79 (H)   Allergen Russian Thistle <0.10   Allergen, Silver Birch 0.14 (H)   Allergen White [place] <0.10   IGE 16        PFTs:     PFTs 12/15/2023    FVC 2.08 L  (58%)  FEV1 1.03 L (36%)  FEV1/FVC 49  DLCO 91%     IMAGES:     XR CHEST 2 VIEWS   LOCATION: [facility]   DATE: 6/25/2023   INDICATION: SOB, leg edema.   COMPARISON: 12/21/2022 CXR and 11/11/2021 CT chest.   IMPRESSION: Strand of fibrosis and/or linear atelectasis left lung base unchanged. Lungs may be mildly hyperinflated. Lungs are otherwise clear. No pleural effusion. Heart size and pulmonary vascularity within normal limits. 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     CT CHEST HI-RESOLUTION WO CONTRAST  LOCATION: [facility]  DATE: 1/5/2024  INDICATION: Severe asthma, hx of tracheobronchomalacia, hx of GRACIELA nodule.  COMPARISON:  11/11/2021.  FINDINGS:   LUNGS AND PLEURA: No groundglass, reticulation or honeycombing. Resolution of prior bilateral opacities and consolidation. Moderate to severe flattening of the trachea and mainstem airways on expiration. Mild middle lobe bronchiectasis. No pleural effusion.  MEDIASTINUM/AXILLAE: No adenopathy. Nonaneurysmal aorta. Normal heart size.  CORONARY ARTERY CALCIFICATION: Moderate.  UPPER ABDOMEN: No significant finding.  MUSCULOSKELETAL: Degenerative changes spine.                                        IMPRESSION:   1.  Redemonstrated findings compatible with tracheobronchomalacia.  2.  Resolution of prior infectious / inflammatory opacities since 11/11/2021. Resolution of previously new left upper lobe inflammatory nodule.  3.  No significant new findings in the lungs.  4.  No airway thickening. Mild middle lobe bronchiectasis.  5.  No fibrotic interstitial lung disease.

## 2024-02-05 NOTE — PATIENT INSTRUCTIONS
Steroid taper   Azithromycin   Continue symbicort 2 puffs twice a day, rinse your mouth with water after each use  Albuterol HFA or DUONEBS every 6 hours as needed  Avoid eating close to bedtime, at least three hours   Raise the head of the bed  Start omeprazole one tab daily   Give a call in two weeks to let me know how are you doing, clinic number [phone], nurse Jayson  Follow up in 3 months

## 2024-04-30 ENCOUNTER — OFFICE VISIT (OUTPATIENT)
Dept: PULMONOLOGY | Facility: CLINIC | Age: 64
End: 2024-04-30
Attending: INTERNAL MEDICINE
Payer: COMMERCIAL

## 2024-04-30 VITALS
DIASTOLIC BLOOD PRESSURE: 73 MMHG | SYSTOLIC BLOOD PRESSURE: 152 MMHG | BODY MASS INDEX: 28.25 KG/M2 | HEART RATE: 82 BPM | WEIGHT: 172.4 LBS | OXYGEN SATURATION: 97 %

## 2024-04-30 DIAGNOSIS — J45.41 MODERATE PERSISTENT ASTHMA WITH EXACERBATION: ICD-10-CM

## 2024-04-30 DIAGNOSIS — J39.8 TRACHEOBRONCHOMALACIA: ICD-10-CM

## 2024-04-30 DIAGNOSIS — R05.3 CHRONIC COUGH: ICD-10-CM

## 2024-04-30 PROCEDURE — 99214 OFFICE O/P EST MOD 30 MIN: CPT | Performed by: INTERNAL MEDICINE

## 2024-04-30 RX ORDER — CLONIDINE 0.3 MG/24H
PATCH, EXTENDED RELEASE TRANSDERMAL
COMMUNITY
Start: 2024-03-12

## 2024-04-30 RX ORDER — TIOTROPIUM BROMIDE INHALATION SPRAY 3.12 UG/1
SPRAY, METERED RESPIRATORY (INHALATION)
COMMUNITY

## 2024-04-30 ASSESSMENT — ASTHMA QUESTIONNAIRES
QUESTION_3 LAST FOUR WEEKS HOW OFTEN DID YOUR ASTHMA SYMPTOMS (WHEEZING, COUGHING, SHORTNESS OF BREATH, CHEST TIGHTNESS OR PAIN) WAKE YOU UP AT NIGHT OR EARLIER THAN USUAL IN THE MORNING: ONCE OR TWICE
QUESTION_1 LAST FOUR WEEKS HOW MUCH OF THE TIME DID YOUR ASTHMA KEEP YOU FROM GETTING AS MUCH DONE AT WORK, SCHOOL OR AT HOME: SOME OF THE TIME
ACT_TOTALSCORE: 12
ACT_TOTALSCORE: 12
QUESTION_4 LAST FOUR WEEKS HOW OFTEN HAVE YOU USED YOUR RESCUE INHALER OR NEBULIZER MEDICATION (SUCH AS ALBUTEROL): THREE OR MORE TIMES PER DAY
QUESTION_5 LAST FOUR WEEKS HOW WOULD YOU RATE YOUR ASTHMA CONTROL: SOMEWHAT CONTROLLED
QUESTION_2 LAST FOUR WEEKS HOW OFTEN HAVE YOU HAD SHORTNESS OF BREATH: MORE THAN ONCE A DAY

## 2024-04-30 NOTE — PATIENT INSTRUCTIONS
Continue symbicort 2 puffs twice a day , use with spacer, rinse your mouth with water after each use  Albuterol HFA or DUONEBS every 6 hours as needed  Continue montelukast 10 mg daily   Avoid eating close to bedtime, at least three hours   Raise the head of the bed  Omeprazole daily as needed  Follow up in 5 months

## 2024-05-07 NOTE — PROGRESS NOTES
PULMONARY OUTPATIENT FOLLOW UP NOTE        Assessment:      Severe persistent asthma   Previous lung function test showed a severe obstructive lung disease. Normal diffusion capacity.   Previous labs showed no eosinophilia, normal IgE level 16, mils reactivity to mold, silver birch, marshelder, oak, ragweed.  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   Continue ICS/LABA, albuterol / duonebs as needed.  Tracheobronchomalacia  Tracheobronchomalacia noted in chest CT scan  Respiratory symptoms have improved with treatment of asthma and GERD.   Resolution of previously described lung nodules      Plan:      Continue symbicort 2 puffs twice a day , use with spacer, rinse your mouth with water after each use  Albuterol HFA or DUONEBS every 6 hours as needed  Continue montelukast 10 mg daily   Avoid eating close to bedtime, at least three hours   Raise the head of the bed  Omeprazole daily as needed  Follow up in 5 months         Pedro Bruce  Pulmonary / Critical Care  April 30, 2024        CC:     Chief Complaint   Patient presents with    Follow Up     2-3 month follow up:  Moderate persistent asthma with exacerbation  Moderate persistent asthma without complication  Tracheobronchomalacia  Environmental allergies  Acute bronchitis, unspecified organism  Chronic cough  Severe persistent asthma with acute exacerbation (H28)           HPI:      Ermias Escamilla is a 64 year old male who presents for follow up.  Patient has history of asthma, tracheobronchomalacia, HTN, DM1, and CKD stage 3.  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   Reports doing well since last visit.   Uses ICS/LABA, albuterol HFA as needed.   Reports mild dry cough, occasional wheezes. No significant limitations with activities.   Denies fever, chills or night sweats.   Reports postnasal drip, denies headaches, denies sinus tenderness. Uses nasal steroid.  Denies chest pain, orthopnea, PND, or swelling  of LEs  Denies acid reflux symptoms, on PPI daily.   Denies sleep problems, ? Snoring, refresh in AM.   Denies tobacco use.        Past Medical History :     Asthma, tracheobronchomalacia, HTN, DM1, and CKD stage 3     Medications:     Current Outpatient Medications   Medication Sig Dispense Refill    ACCU-CHEK GUIDE test strip       albuterol (PROAIR HFA/PROVENTIL HFA/VENTOLIN HFA) 108 (90 Base) MCG/ACT inhaler       amitriptyline (ELAVIL) 10 MG tablet Take 20 mg by mouth      amLODIPine (NORVASC) 5 MG tablet Take 5 mg by mouth      aspirin 81 MG EC tablet Take 1 tablet by mouth daily      budesonide-formoterol (SYMBICORT) 160-4.5 MCG/ACT Inhaler Inhale 2 puffs into the lungs 2 times daily 10.2 g 11    Cholecalciferol (VITAMIN D3 PO) Take by mouth daily      cloNIDine (CATAPRES-TTS3) 0.3 MG/24HR WK patch       Ferrous Gluconate (FERGON) 240 (27 Fe) MG TABS Take 240 mg by mouth      fish oil-omega-3 fatty acids 1000 MG capsule Take 1,000 mg by mouth      fluticasone (FLONASE) 50 MCG/ACT nasal spray = 2 spray(s), Nasal, daily, # 16 gm, 11 Refill(s), Type: Maintenance, Pharmacy: [facility], 2 spray(s) Nasal daily, 67, in, 02/14/22 12:56:00 CST, Height Measured, 173, lb, 02/14/22 12:56:00 CST, Weight Measured      HUMALOG 100 UNIT/ML injection See Instructions, Instructions: INJECT 35 UNITS DAILY VIA PUMP, # 50 mL, 0 Refill(s), Type: Maintenance, Pharmacy: [facility], INJECT 35 UNITS DAILY VIA PUMP, 67, in, 06/25/23 13:56:00 CDT, Height Measured, 154.6, lb, 07/17/23 14:06:00 CDT, Weight Measured      ipratropium - albuterol 0.5 mg/2.5 mg/3 mL (DUONEB) 0.5-2.5 (3) MG/3ML neb solution Inhale 3 mLs into the lungs      losartan (COZAAR) 100 MG tablet Take 100 mg by mouth      Multiple Vitamin (MULTI-VITAMINS) TABS Take 1 tablet by mouth daily      omeprazole (PRILOSEC) 20 MG DR capsule Take 1 capsule (20 mg) by mouth daily 30 capsule 12    pravastatin (PRAVACHOL) 20 MG tablet Take 20  mg by mouth      simvastatin (ZOCOR) 20 MG tablet Take 20 mg by mouth      SINGULAIR 10 MG tablet Take 1 tablet (10 mg) by mouth at bedtime 30 tablet 11    SPIRIVA RESPIMAT 2.5 MCG/ACT inhaler INHALE 2 PUFFS DAILY      TURMERIC PO Take by mouth daily      WELLBUTRIN  MG 24 hr tablet Take 150 mg by mouth      cloNIDine (CATAPRES-TTS2) 0.2 MG/24HR WK patch       predniSONE (DELTASONE) 10 MG tablet 40 mg (4 tabs) daily x 3 days then 30 mg (3 tabs) daily x 3 days then 20 mg (2 tabs) daily for 3 days then 10 mg daily for 3 days 30 tablet 0     No current facility-administered medications for this visit.     Social History :     Social History     Socioeconomic History    Marital status: Single     Spouse name: Not on file    Number of children: Not on file    Years of education: Not on file    Highest education level: Not on file   Occupational History    Not on file   Tobacco Use    Smoking status: Never     Passive exposure: Past (Mom was a smoker)    Smokeless tobacco: Never   Vaping Use    Vaping status: Never Used   Substance and Sexual Activity    Alcohol use: Not on file    Drug use: Not on file    Sexual activity: Not on file   Other Topics Concern    Not on file   Social History Narrative    Not on file     Social Determinants of Health     Financial Resource Strain: Not on file   Food Insecurity: Not on file   Transportation Needs: Not on file   Physical Activity: Not on file   Stress: Not on file   Social Connections: Not on file   Interpersonal Safety: Not on file   Housing Stability: Not on file          Family History :     No family history on file.    Review of Systems  A 12 point comprehensive review of systems was negative except as noted.        Objective:     BP (!) 152/73 (BP Location: Right arm, Patient Position: Sitting, Cuff Size: Adult Regular)   Pulse 82   Wt 78.2 kg (172 lb 6.4 oz)   SpO2 97%   BMI 28.25 kg/m        Gen: awake, alert, no distress  HEENT: pink conjunctiva, moist  mucosa, Mallampati II/IV  Neck: no thyromegaly, masses or JVD  Lungs: wheezes both HT  CV: regular, no murmurs or gallops appreciated  Abdomen: soft, NT, BS wnl  Ext: no edema  Neuro: CN II-XII intact, non focal      Diagnostic tests:       01/05/24 14:13   Allergen A alternata 1.13 (H)   Allergen A fumigatus <0.10   Allergen, [place] Grass <0.10   Allergen C herbarum <0.10   Allergen Cat Dander <0.10   Allergen Cockroach <0.10   Allergen D farinae <0.10   Allergen, D Pteronyssinus <0.10   Allergen Dog Dander <0.10   Allergen Elm <0.10   Allergen Maple <0.10   Allergen, Mtn [place] <0.10   Allergen Oak(white) 0.14 (H)   Allergen P notatum <0.10   Allergen Manjit <0.10   Allergen Tree [place] IgE <0.10   Allergen Weed [place]ettle IgE <0.10   Allergen [place] <0.10   Allergen Marshelder 0.11 (H)   Allergen, Mouse Urine <0.10   Allergen, Ragweed Short 1.79 (H)   Allergen Russian Thistle <0.10   Allergen, Silver Birch 0.14 (H)   Allergen White [place] <0.10   IGE 16        PFTs:     PFTs 12/15/2023    FVC 2.08 L  (58%)  FEV1 1.03 L (36%)  FEV1/FVC 49  DLCO 91%     IMAGES:     XR CHEST 2 VIEWS   LOCATION: [facility]   DATE: 6/25/2023   INDICATION: SOB, leg edema.   COMPARISON: 12/21/2022 CXR and 11/11/2021 CT chest.   IMPRESSION: Strand of fibrosis and/or linear atelectasis left lung base unchanged. Lungs may be mildly hyperinflated. Lungs are otherwise clear. No pleural effusion. Heart size and pulmonary vascularity within normal limits. 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     CT CHEST HI-RESOLUTION WO CONTRAST  LOCATION: [facility]  DATE: 1/5/2024  INDICATION: Severe asthma, hx of tracheobronchomalacia, hx of GRACIELA nodule.  COMPARISON: 11/11/2021.  FINDINGS:   LUNGS AND PLEURA: No groundglass, reticulation or honeycombing. Resolution of prior bilateral opacities and consolidation. Moderate to severe flattening of the  trachea and mainstem airways on expiration. Mild middle lobe bronchiectasis. No pleural effusion.  MEDIASTINUM/AXILLAE: No adenopathy. Nonaneurysmal aorta. Normal heart size.  CORONARY ARTERY CALCIFICATION: Moderate.  UPPER ABDOMEN: No significant finding.  MUSCULOSKELETAL: Degenerative changes spine.                                        IMPRESSION:   1.  Redemonstrated findings compatible with tracheobronchomalacia.  2.  Resolution of prior infectious / inflammatory opacities since 11/11/2021. Resolution of previously new left upper lobe inflammatory nodule.  3.  No significant new findings in the lungs.  4.  No airway thickening. Mild middle lobe bronchiectasis.  5.  No fibrotic interstitial lung disease.

## 2024-07-15 NOTE — PROGRESS NOTES
Pulmonary Clinic Follow-up          Assessment/Plan:     63 year old male with a history of asthma, CKD, DM I, peripheral neuropathy, HLD, HTN, hypothyroidism, retinopathy, seasonal allergies, presenting for one month follow-up of asthma.    Moderate persistent asthma  Tracheobronchomalacia   Environmental allergies  Pulmonary nodules  Lifelong non-smoker presented last visit for evaluation of severe asthma.  Hx of environmental allergies requiring allergy testing, but he is unsure of exact results.  Reports asthma exacerbations about 3 times/year.  He has been out of work since August due to his breathing and neuropathy, which has had benefit for his breathing.  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  Pulmonary function testing shows severe airway obstruction (FEV1 36% predicted), and normal diffusion capacity.  Some results failed to meet ATS criteria, with variable results.  Patient declined Pleth testing.  Chest CTs in 2021 have shown evidence of tracheobronchomalacia per radiology reports, but I do not have these images available to me today.  FeNO 9 last visit.  High resolution chest CT performed on 1/5/24, which shows moderate to severe flattening of trachea and mainstem airways on expiration, mild bronchiectasis.  Absolute eosinophils 0.2, IgE 16, reactivity to mold, silver birch, marshelder, oak, ragweed.  Last visit his asthma regimen was increased from Qvar to Symbicort, and started in Singulair. He is not noting much difference in his dyspnea after inhaler change.     Plan:  - discussed chest imaging with patient today.  - case was discussed with Dr Mercedes, will have patient follow-up with him to discuss next steps for tracheobronchomalacia.  - continue Symbicort (budesonide/formoterol) 160/4.5, two puffs BID, rinse/gargle after use.  - continue Albuterol inhaler or Duonebs PRN  - continue Singulair 10mg daily  - Action plan: he prefers not to take prednisone if at all possible, due  to high BGs (DM I).  He is usually given azithromycin with benefit.  Can offer lower dose of prednisone 20mg x5-7 days.  - he is due for COVID vaccines, annual influenza vaccine, and pneumococcal vaccine.  He declines vaccination today.    Follow-up  - with physician next opening, to address tracheobronchomalacia      Maura Matta, CNP  Pulmonary Medicine  [facility]  [phone]       CC:     Asthma follow-up     HPI:     63 year old male with a history of asthma, CKD, DM I, peripheral neuropathy, HLD, HTN, hypothyroidism, retinopathy, seasonal allergies, presenting for one month follow-up of asthma.    Since last visit, he reports no improvement with Symbicort.  He notices dyspnea with any activity.  Does notice worsening after big meal.  No concern with swallowing, no acid reflux.    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  Has worked in loan environments most of life.  Chest tightness, coughing, shortness of breath.  Given azithromycin for these episodes, cleared up.  Three times/year has episodes, winter x2 and then fall (ragweed).  Steroids mess with blood sugar, so only antibiotics.  Episodes after nose running.  Given albuterol in early 2000s, did find benefit.  Andrews Cox, didn't notice much benefit.  Switched to Qvar, very beneficial, helps bring up mucous.  Not having as many episodes.  Has been out of work since August.  [place] windows, inhalational exposures, very loan, using saw.  Worse breathing.  Stopping worked helped breathing greatly.  Using albuterol once/month when feeling well, then increases during episodes.  Duonebs only during episodes.  Had allergy testing, tree pollen, ragweed, 3 or 4 different types of trees.  Has sinus congestion and drainage.  Flonase once daily, beneficial.  On singulair 10mg, breathing did improve with this.  Having issues with neuropathy.  Lifelong non-smoker.  No vaping, marijuana use.  Has had PFTs  before.        12/13/2023     2:53 PM 1/15/2024     2:01 PM   ACT Total Scores   ACT TOTAL SCORE (Goal Greater than or Equal to 20) 10 12   In the past 12 months, how many times did you visit the emergency room for your asthma without being admitted to the hospital? 0 0   In the past 12 months, how many times were you hospitalized overnight because of your asthma? 0 0        ROS:     A 6-system review was obtained and was negative with the exception of the symptoms endorsed in the HPI.       Medical history:       PMH:  No past medical history on file.    PSH:  No past surgical history on file.    Allergies:  Allergies   Allergen Reactions    Levofloxacin GI Disturbance, Nausea and Other (See Comments)     Upset stomach    Poliomyelitis Vaccine, Inactivated      Patient is diabetic and thinks it could be due to that    Doxycycline GI Disturbance, Nausea and Vomiting and Other (See Comments)     VOMITING       Family Hx:  No family history on file.    Social Hx:  Social History     Socioeconomic History    Marital status: Single     Spouse name: Not on file    Number of children: Not on file    Years of education: Not on file    Highest education level: Not on file   Occupational History    Not on file   Tobacco Use    Smoking status: Never    Smokeless tobacco: Never   Vaping Use    Vaping Use: Never used   Substance and Sexual Activity    Alcohol use: Not on file    Drug use: Not on file    Sexual activity: Not on file   Other Topics Concern    Not on file   Social History Narrative    Not on file     Social Determinants of Health     Financial Resource Strain: Not on file   Food Insecurity: Not on file   Transportation Needs: Not on file   Physical Activity: Not on file   Stress: Not on file   Social Connections: Not on file   Interpersonal Safety: Not on file   Housing Stability: Not on file       Current Meds:  Current Outpatient Medications   Medication Sig Dispense Refill    ACCU-CHEK GUIDE test strip        albuterol (PROAIR HFA/PROVENTIL HFA/VENTOLIN HFA) 108 (90 Base) MCG/ACT inhaler       amitriptyline (ELAVIL) 10 MG tablet Take 20 mg by mouth      amLODIPine (NORVASC) 5 MG tablet Take 5 mg by mouth      aspirin 81 MG EC tablet Take 1 tablet by mouth daily      budesonide-formoterol (SYMBICORT) 160-4.5 MCG/ACT Inhaler Inhale 2 puffs into the lungs 2 times daily 10.2 g 11    Cholecalciferol (VITAMIN D3 PO) Take by mouth daily      cloNIDine (CATAPRES-TTS2) 0.2 MG/24HR WK patch       Ferrous Gluconate (FERGON) 240 (27 Fe) MG TABS Take 240 mg by mouth      fish oil-omega-3 fatty acids 1000 MG capsule Take 1,000 mg by mouth      fluticasone (FLONASE) 50 MCG/ACT nasal spray = 2 spray(s), Nasal, daily, # 16 gm, 11 Refill(s), Type: Maintenance, Pharmacy: [facility], 2 spray(s) Nasal daily, 67, in, 02/14/22 12:56:00 CST, Height Measured, 173, lb, 02/14/22 12:56:00 CST, Weight Measured      HUMALOG 100 UNIT/ML injection See Instructions, Instructions: INJECT 35 UNITS DAILY VIA PUMP, # 50 mL, 0 Refill(s), Type: Maintenance, [facility], INJECT 35 UNITS DAILY VIA PUMP, 67, in, 06/25/23 13:56:00 CDT, Height Measured, 154.6, lb, 07/17/23 14:06:00 CDT, Weight Measured      ipratropium - albuterol 0.5 mg/2.5 mg/3 mL (DUONEB) 0.5-2.5 (3) MG/3ML neb solution Inhale 3 mLs into the lungs      losartan (COZAAR) 100 MG tablet Take 100 mg by mouth      Multiple Vitamin (MULTI-VITAMINS) TABS Take 1 tablet by mouth daily      pravastatin (PRAVACHOL) 20 MG tablet Take 20 mg by mouth      simvastatin (ZOCOR) 20 MG tablet Take 20 mg by mouth      SINGULAIR 10 MG tablet Take 1 tablet (10 mg) by mouth at bedtime 30 tablet 11    TURMERIC PO Take by mouth daily      WELLBUTRIN  MG 24 hr tablet Take 150 mg by mouth            Physical Exam:     BP (!) 151/88 (BP Location: Right arm, Patient Position: Sitting, Cuff Size: Adult Regular)   Pulse 89   Wt 74.9 kg (165 lb 3.2 oz)   SpO2 97%   BMI  27.07 kg/m    Gen: adult male , appears in NAD  HEENT: clear conjunctivae, moist mucous membranes  CV: RRR, no M/G/R  Resp: CTA bilaterally.  Respirations even and unlabored.  On RA.   No stridor noted upon auscultation of neck.  Skin: no apparent rashes on visible skin  Ext: no cyanosis, clubbing or edema  Neuro: alert and answering questions appropriately       Data:     Labs:  reviewed    Imaging studies:  I have personally reviewed all pertinent imaging studies and PFT results unless otherwise noted.    EXAM: CT CHEST HI-RESOLUTION WO CONTRAST  LOCATION: [facility]  DATE: 1/5/2024  FINDINGS:   LUNGS AND PLEURA: No groundglass, reticulation or honeycombing. Resolution of prior bilateral opacities and consolidation. Moderate to severe flattening of the trachea and mainstem airways on expiration. Mild middle lobe bronchiectasis. No pleural   effusion.  MEDIASTINUM/AXILLAE: No adenopathy. Nonaneurysmal aorta. Normal heart size.  CORONARY ARTERY CALCIFICATION: Moderate.  UPPER ABDOMEN: No significant finding.  MUSCULOSKELETAL: Degenerative changes spine.                                                           IMPRESSION:   1.  Redemonstrated findings compatible with tracheobronchomalacia.  2.  Resolution of prior infectious / inflammatory opacities since 11/11/2021. Resolution of previously new left upper lobe inflammatory nodule.  3.  No significant new findings in the lungs.  4.  No airway thickening. Mild middle lobe bronchiectasis.  5.  No fibrotic interstitial lung disease.        EXAM: CT CHEST WO IV CONT   LOCATION: [facility]   DATE/TIME: 11/11/2021   FINDINGS:   LUNGS AND PLEURA: The airways remain markedly flattened and stable, suggesting tracheobronchomalacia. There is mild consolidation with volume loss in the right upper lobe inferiorly and anteriorly suggesting atelectasis. No obstructive airway lesion is seen. There are several mild patchy and confluent  groundglass densities predominantly within the lingula and left lower lobe anteriorly as well as a small area within the right lower lobe in the lung base which could be due to pneumonia or resolving pneumonia. No additional consolidation or pleural effusion. 4 mm subpleural nodule in the left upper lobe posteriorly along the major fissure which could be intrafissural node and new on series 3, image #52. No suspicious pulmonary nodules per   MEDIASTINUM/AXILLAE: The heart is normal in size. The thoracic aorta is nonaneurysmal. No suspicious adenopathy.   CORONARY ARTERY CALCIFICATION: Moderate.   UPPER ABDOMEN: 2 mm nonobstructive left renal calculus. No suspicious findings.   MUSCULOSKELETAL: Mild degenerative changes of the spine.   IMPRESSION:   1.  Mild groundglass opacities predominantly in the lingula and left lower lobe suggesting mild or resolving pneumonia. No larger areas of consolidation, pleural effusion, or additional complication.   2.  Stable findings suggesting tracheobronchomalacia with new area of atelectasis in the right upper lobe. No obstructive airway lesion.   3.  4 mm subpleural nodule in the left upper lobe is new from previous imaging and may be a reactive intrafissural node. Follow-up CT in 12 months could be considered although likely benign.       EXAM: CT CHEST WO IV CONT HI-RES   LOCATION: [facility]   DATE/TIME: 2/8/2021  FINDINGS:   LUNGS AND PLEURA: AP diameter of the trachea is 1.7 cm in inspiration and 0.9 cm in expiration with significant bowing of the anterior tracheal cartilage and near complete occlusion of the lumens of the mainstem bronchi bilaterally during expiration. Benign variant right lower lobe inferior accessory fissure. No reticulation, bronchiectasis, honeycombing, or evidence of gas trapping.   MEDIASTINUM/AXILLAE: Moderate calcified three-vessel coronary atherosclerosis and/or stents. Small fat-containing right Bochdalek hernia.   UPPER ABDOMEN:  Tiny nonobstructing bilateral kidney stones.   MUSCULOSKELETAL: Marked hypertrophic change thoracic spine.   IMPRESSION:   1.  Abnormally compliant tracheal and bronchial cartilage, suspicious for tracheobronchomalacia.   2.  No evidence of interstitial lung disease       Pulmonary Function Testing    12/15/23:     None

## 2024-10-27 ASSESSMENT — ASTHMA QUESTIONNAIRES
ACT_TOTALSCORE: 18
ACT_TOTALSCORE: 18
QUESTION_3 LAST FOUR WEEKS HOW OFTEN DID YOUR ASTHMA SYMPTOMS (WHEEZING, COUGHING, SHORTNESS OF BREATH, CHEST TIGHTNESS OR PAIN) WAKE YOU UP AT NIGHT OR EARLIER THAN USUAL IN THE MORNING: NOT AT ALL
QUESTION_1 LAST FOUR WEEKS HOW MUCH OF THE TIME DID YOUR ASTHMA KEEP YOU FROM GETTING AS MUCH DONE AT WORK, SCHOOL OR AT HOME: A LITTLE OF THE TIME
QUESTION_5 LAST FOUR WEEKS HOW WOULD YOU RATE YOUR ASTHMA CONTROL: WELL CONTROLLED
QUESTION_2 LAST FOUR WEEKS HOW OFTEN HAVE YOU HAD SHORTNESS OF BREATH: MORE THAN ONCE A DAY
QUESTION_4 LAST FOUR WEEKS HOW OFTEN HAVE YOU USED YOUR RESCUE INHALER OR NEBULIZER MEDICATION (SUCH AS ALBUTEROL): ONCE A WEEK OR LESS

## 2024-10-28 ENCOUNTER — OFFICE VISIT (OUTPATIENT)
Dept: PULMONOLOGY | Facility: CLINIC | Age: 64
End: 2024-10-28
Attending: INTERNAL MEDICINE
Payer: COMMERCIAL

## 2024-10-28 VITALS
DIASTOLIC BLOOD PRESSURE: 84 MMHG | BODY MASS INDEX: 27.86 KG/M2 | SYSTOLIC BLOOD PRESSURE: 124 MMHG | WEIGHT: 170 LBS | OXYGEN SATURATION: 96 % | HEART RATE: 106 BPM

## 2024-10-28 DIAGNOSIS — J45.41 MODERATE PERSISTENT ASTHMA WITH EXACERBATION: ICD-10-CM

## 2024-10-28 DIAGNOSIS — J39.8 TRACHEOMALACIA: ICD-10-CM

## 2024-10-28 DIAGNOSIS — J45.40 MODERATE PERSISTENT ASTHMA WITHOUT COMPLICATION: Primary | ICD-10-CM

## 2024-10-28 DIAGNOSIS — R05.3 CHRONIC COUGH: ICD-10-CM

## 2024-10-28 PROCEDURE — G2211 COMPLEX E/M VISIT ADD ON: HCPCS | Performed by: INTERNAL MEDICINE

## 2024-10-28 PROCEDURE — 99214 OFFICE O/P EST MOD 30 MIN: CPT | Performed by: INTERNAL MEDICINE

## 2024-10-28 RX ORDER — FAMOTIDINE 40 MG/1
40 TABLET, FILM COATED ORAL AT BEDTIME
Qty: 30 TABLET | Refills: 12 | Status: SHIPPED | OUTPATIENT
Start: 2024-10-28

## 2024-10-28 NOTE — PATIENT INSTRUCTIONS
Resume symbicort 2 puffs twice a day , use with spacer, rinse your mouth with water after each use  Albuterol HFA or DUONEBS every 6 hours as needed  Continue montelukast 10 mg daily   Avoid eating close to bedtime, at least three hours   Raise the head of the bed  Famotidine 40 mg QHS  Follow up in 6 months

## 2024-11-08 NOTE — PROGRESS NOTES
PULMONARY OUTPATIENT FOLLOW UP NOTE        Assessment:      Severe persistent asthma   Previous lung function test showed a severe obstructive lung disease. Normal diffusion capacity.   Previous labs showed no eosinophilia, normal IgE level 16, mild reactivity to mold, silver birch, marshelder, oak, ragweed.  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   Continue ICS/LABA, albuterol / duonebs as needed.  Add montelukast   Tracheobronchomalacia  Tracheobronchomalacia noted in chest CT scan  Respiratory symptoms have improved with treatment of asthma and acid reflux.   Resolution of previously described lung nodules      Plan:      Resume symbicort 2 puffs twice a day , use with spacer, rinse your mouth with water after each use  Albuterol HFA or DUONEBS every 6 hours as needed  Continue montelukast 10 mg daily   Avoid eating close to bedtime, at least three hours   Raise the head of the bed  Famotidine 40 mg QHS  Follow up in 6 months         Pedro Bruce  Pulmonary / Critical Care  October 28, 2024       CC:     Chief Complaint   Patient presents with    Follow Up     HPI:      Ermias Escamilla is a 64 year old male who presents for follow up.  Patient has history of asthma, tracheobronchomalacia, HTN, DM1, and CKD stage 3.  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   Reports doing well since last visit.   Uses ICS/LABA, albuterol HFA as needed.   Denies fever, chills or night sweats.   Reports postnasal drip, denies headaches, denies sinus tenderness. Uses nasal steroid.  No shortness of breath, no limitations with activities, occasional wheezes, occasional dry cough.   Denies chest pain, orthopnea, PND, or swelling of LEs  Denies acid reflux symptoms, on PPI daily.   Denies sleep problems, ? Snoring, refresh in AM.   Denies tobacco use.        Past Medical History :     Asthma, tracheobronchomalacia, HTN, DM1, and CKD stage 3     Medications:     Current Outpatient  Medications   Medication Sig Dispense Refill    ACCU-CHEK GUIDE test strip 1 strip daily.      albuterol (PROAIR HFA/PROVENTIL HFA/VENTOLIN HFA) 108 (90 Base) MCG/ACT inhaler Inhale 1-2 puffs into the lungs every 6 hours as needed.      amitriptyline (ELAVIL) 10 MG tablet Take 40 mg by mouth at bedtime.      amLODIPine (NORVASC) 5 MG tablet Take 5 mg by mouth daily.      aspirin 81 MG EC tablet Take 1 tablet by mouth daily      budesonide-formoterol (SYMBICORT) 160-4.5 MCG/ACT Inhaler Inhale 2 puffs into the lungs 2 times daily (Patient taking differently: Inhale 2 puffs into the lungs 2 times daily. As needed) 10.2 g 11    Cholecalciferol (VITAMIN D3 PO) Take 1,000 Units by mouth daily.      cloNIDine (CATAPRES-TTS3) 0.3 MG/24HR WK patch Place 1 patch onto the skin once a week.      famotidine (PEPCID) 40 MG tablet Take 1 tablet (40 mg) by mouth at bedtime. 30 tablet 12    fish oil-omega-3 fatty acids 1000 MG capsule Take 1,000 mg by mouth daily.      fluticasone (FLONASE) 50 MCG/ACT nasal spray Spray 1 spray in nostril. As needed      HUMALOG 100 UNIT/ML injection See Instructions, Instructions: INJECT 35 UNITS DAILY VIA PUMP, # 50 mL, 0 Refill(s), Type: Maintenance, [facility], INJECT 35 UNITS DAILY VIA PUMP, 67, in, 06/25/23 13:56:00 CDT, Height Measured, 154.6, lb, 07/17/23 14:06:00 CDT, Weight Measured      ipratropium - albuterol 0.5 mg/2.5 mg/3 mL (DUONEB) 0.5-2.5 (3) MG/3ML neb solution Inhale 3 mLs into the lungs every 6 hours as needed.      Multiple Vitamin (MULTI-VITAMINS) TABS Take 1 tablet by mouth daily      omeprazole (PRILOSEC) 20 MG DR capsule Take 1 capsule (20 mg) by mouth daily 30 capsule 12    SINGULAIR 10 MG tablet Take 1 tablet (10 mg) by mouth at bedtime 30 tablet 11    TURMERIC PO Take 1 tablet by mouth daily.      WELLBUTRIN  MG 24 hr tablet Take 150 mg by mouth every morning.      cloNIDine (CATAPRES-TTS2) 0.2 MG/24HR WK patch       Ferrous Gluconate  (FERGON) 240 (27 Fe) MG TABS Take 240 mg by mouth daily.      losartan (COZAAR) 100 MG tablet Take 100 mg by mouth daily.      pravastatin (PRAVACHOL) 20 MG tablet Take 20 mg by mouth daily.      predniSONE (DELTASONE) 10 MG tablet 40 mg (4 tabs) daily x 3 days then 30 mg (3 tabs) daily x 3 days then 20 mg (2 tabs) daily for 3 days then 10 mg daily for 3 days 30 tablet 0    simvastatin (ZOCOR) 20 MG tablet Take 20 mg by mouth (Patient not taking: Reported on 10/28/2024)      SPIRIVA RESPIMAT 2.5 MCG/ACT inhaler Inhale 2 puffs into the lungs daily. As needed       No current facility-administered medications for this visit.     Social History :     Social History     Socioeconomic History    Marital status: Single     Spouse name: Not on file    Number of children: Not on file    Years of education: Not on file    Highest education level: Not on file   Occupational History    Not on file   Tobacco Use    Smoking status: Never     Passive exposure: Past (Mom was a smoker)    Smokeless tobacco: Never   Vaping Use    Vaping status: Never Used   Substance and Sexual Activity    Alcohol use: Not on file    Drug use: Not on file    Sexual activity: Not on file   Other Topics Concern    Not on file   Social History Narrative    Not on file     Social Drivers of Health     Financial Resource Strain: Not on file   Food Insecurity: No Food Insecurity (6/25/2023)    [facility]    Hunger Vital Sign     Worried About Running Out of Food in the Last Year: Never true     Ran Out of Food in the Last Year: Never true   Transportation Needs: Not on file   Physical Activity: Not on file   Stress: Not on file   Social Connections: Not on file   Interpersonal Safety: Not on file   Housing Stability: Not on file          Family History :     No family history on file.    Review of Systems  A 12 point comprehensive review of systems was negative except as noted.        Objective:     /84   Pulse 106   Wt 77.1 kg  (170 lb)   SpO2 96%   BMI 27.86 kg/m        Gen: awake, alert, no distress  HEENT: pink conjunctiva, moist mucosa, Mallampati II/IV  Neck: no thyromegaly, masses or JVD  Lungs: wheezes both HT  CV: regular, no murmurs or gallops appreciated  Abdomen: soft, NT, BS wnl  Ext: no edema  Neuro: CN II-XII intact, non focal      Diagnostic tests:       01/05/24 14:13   Allergen A alternata 1.13 (H)   Allergen A fumigatus <0.10   Allergen, [place] Grass <0.10   Allergen C herbarum <0.10   Allergen Cat Dander <0.10   Allergen Cockroach <0.10   Allergen D farinae <0.10   Allergen, D Pteronyssinus <0.10   Allergen Dog Dander <0.10   Allergen Elm <0.10   Allergen Maple <0.10   Allergen, Mtn [place] <0.10   Allergen Oak(white) 0.14 (H)   Allergen P notatum <0.10   Allergen Manjit <0.10   Allergen Tree White [place] IgE <0.10   Allergen Weed Nettle IgE <0.10   Allergen Watchung <0.10   Allergen Marshelder 0.11 (H)   Allergen, Mouse Urine <0.10   Allergen, Ragweed Short 1.79 (H)   Allergen Russian Thistle <0.10   Allergen, Silver Birch 0.14 (H)   Allergen White Ricardo <0.10   IGE 16        PFTs:     PFTs 12/15/2023    FVC 2.08 L  (58%)  FEV1 1.03 L (36%)  FEV1/FVC 49  DLCO 91%     IMAGES:     XR CHEST 2 VIEWS   LOCATION: [facility]   DATE: 6/25/2023   INDICATION: SOB, leg edema.   COMPARISON: 12/21/2022 CXR and 11/11/2021 CT chest.   IMPRESSION: Strand of fibrosis and/or linear atelectasis left lung base unchanged. Lungs may be mildly hyperinflated. Lungs are otherwise clear. No pleural effusion. Heart size and pulmonary vascularity within normal limits. 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     CT CHEST HI-RESOLUTION WO CONTRAST  LOCATION: [facility]  DATE: 1/5/2024  INDICATION: Severe asthma, hx of tracheobronchomalacia, hx of GRACIELA nodule.  COMPARISON: 11/11/2021.  FINDINGS:   LUNGS AND PLEURA: No groundglass, reticulation or  honeycombing. Resolution of prior bilateral opacities and consolidation. Moderate to severe flattening of the trachea and mainstem airways on expiration. Mild middle lobe bronchiectasis. No pleural effusion.  MEDIASTINUM/AXILLAE: No adenopathy. Nonaneurysmal aorta. Normal heart size.  CORONARY ARTERY CALCIFICATION: Moderate.  UPPER ABDOMEN: No significant finding.  MUSCULOSKELETAL: Degenerative changes spine.                                        IMPRESSION:   1.  Redemonstrated findings compatible with tracheobronchomalacia.  2.  Resolution of prior infectious / inflammatory opacities since 11/11/2021. Resolution of previously new left upper lobe inflammatory nodule.  3.  No significant new findings in the lungs.  4.  No airway thickening. Mild middle lobe bronchiectasis.  5.  No fibrotic interstitial lung disease.

## 2025-01-19 ENCOUNTER — HEALTH MAINTENANCE LETTER (OUTPATIENT)
Age: 65
End: 2025-01-19

## 2025-01-22 ENCOUNTER — MYC MEDICAL ADVICE (OUTPATIENT)
Dept: PULMONOLOGY | Facility: CLINIC | Age: 65
End: 2025-01-22
Payer: COMMERCIAL

## 2025-01-22 DIAGNOSIS — J45.50 SEVERE PERSISTENT ASTHMA WITHOUT COMPLICATION (H): Primary | ICD-10-CM

## 2025-01-30 ENCOUNTER — THERAPY VISIT (OUTPATIENT)
Dept: PHYSICAL THERAPY | Facility: REHABILITATION | Age: 65
End: 2025-01-30
Attending: INTERNAL MEDICINE
Payer: COMMERCIAL

## 2025-01-30 DIAGNOSIS — J45.50 SEVERE PERSISTENT ASTHMA WITHOUT COMPLICATION (H): ICD-10-CM

## 2025-01-30 DIAGNOSIS — J39.8 TRACHEOMALACIA: ICD-10-CM

## 2025-01-30 DIAGNOSIS — J45.30 MILD PERSISTENT ASTHMA, UNSPECIFIED WHETHER COMPLICATED: ICD-10-CM

## 2025-01-30 NOTE — PROGRESS NOTES
"PHYSICAL THERAPY EVALUATION  Type of Visit: Evaluation    Subjective      {Disappearing TIP  Health History pop-up / flowsheet :928775}   Presenting condition or subjective complaint: Disability Insurance evluaion  Pt reports that his walking/activity tolerance depends on the day but that he could walk up to a block \"at the most\". 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 Pt notes that he has stairs in his home and that after going up/down stairs that this can be very tiring and causes him to be winded. A seated rest break of at least 5 minutes will bring him back to his baseline. He denies using supplemental oxygen. He has nebs and inhalers as needed.     Date of onset:   {Disappearing TIP  Date of onset/injury :350859}   Relevant medical history: Asthma; COPD; Depression; Diabetes; High blood pressure; Kidney disease   Dates & types of surgery: Retinal refractory right eye, cateract removed both eyed    Prior diagnostic imaging/testing results: CT scan; X-ray     Prior therapy history for the same diagnosis, illness or injury: Yes Estimate 03/2021    Prior Level of Function  Transfers: Independent  Ambulation: Independent    Living Environment  Social support: Alone   Type of home: House   Stairs to enter the home:         Ramp: No   Stairs inside the home: Yes 14 Is there a railing: Yes     Help at home: None  Equipment owned:       Employment: Not Applicable    Hobbies/Interests: Woodworking, fixing stuff, movies    Patient goals for therapy: Walk, climb dtairs, breathe freely    Pain assessment: Pain denied     Objective     Cognitive Status Examination  Orientation: {BOY COGNITIVE ORIENTATION STATUS (Optional):844932}   Level of Consciousness: {BOY LEVEL OF CONSCIOUS:529766}  Follows Commands and Answers Questions: {BOY COMMANDS QUESTIONS:212406}  Personal Safety and Judgement: {BOY SAFETY AND JUDGEMENT:367371}  Memory: {BOY intact " impaired:904939}    OBSERVATION    RANGE OF MOTION: {BOY ROM:869598}  STRENGTH: {BOY Strength:717038}  SENSATION: {BOY SENSATION:138328}    BED MOBILITY: {BOY MOBILITY:654464}    TRANSFERS: {BOY MOBILITY:554347}    GAIT ANALYSIS: ***    BALANCE: ***    SPECIAL TESTS  Vitals at Rest:  SaO2: 98%  HR: 89 bpm    Timed Up and Go (TUG) 11.22 sec  With no AD Increased risk for falls with standard TUG >11.5secs for people with PD, >13.5secs for Community Dwelling Elders.   5 Times Sit-to-Stand (5TSTS) - sec 14.5s  Increased risk for falls 5x STS >16 secs for people with PD, >15 secs for Community Dwelling Elders.  5x STS Normative Values by Age Category (Healthy Population)( Age in years (n) Mean   SD): Ages 60-69: 7.8   2.4 sec    30 sec Chair Stand: 10 reps  Comments: RPE: 0.5, RPD: 0 SaO2: 92-93% HR: 97 Age/Gender Norm: Male 60-64: 17   10 Meter Walk Test (Comfortable):  5.75s  # of steps: *** Age/Gender Norm (meters/second): Men in their 60s: 1.339    6 Minute Walk Test (6MWT)   1083 ft     Did not require standing rest break, Vitals response: SaO2: 96%, HR: 99 bpm, Fatigue/OMNI Effort Scale: RPE: 1/10, RPD: 2/10  Comments about turns: Steady, no A D Age/Gender Norm: Male 60-69 yrs: 572 m/1876.64ft        Assessment & Plan   CLINICAL IMPRESSIONS  Medical Diagnosis:    {Disappearing TIP  Medical Dx :893855}  Treatment Diagnosis:   {Disappearing TIP  Treatment Dx :297986}  Impression/Assessment: {boy pt assessment:856920}    Clinical Decision Making (Complexity):  Clinical Presentation: Stable/Uncomplicated  Clinical Presentation Rationale: based on medical and personal factors listed in PT evaluation  Clinical Decision Making (Complexity): Low complexity    PLAN OF CARE  Treatment Interventions:  None    Long Term Goals {Disappearing TIP  Goals :701594}         {Disapparing TIP  Frequency/Duration :597377}  Frequency of Treatment:    Duration of Treatment:      Recommended Referrals to Other Professionals: {boy  referrals suggested:164419}  Education Assessment: {Disapparing TIP  Patient Education :340900}       Risks and benefits of evaluation/treatment have been explained.   Patient/Family/caregiver agrees with Plan of Care.     Evaluation Time:   {Disappearing TIP  Eval Minutes :817917}     {EVAL ONLY:994728}     Signing Clinician: Suzie Ahuja, PT

## 2025-04-30 DIAGNOSIS — R05.3 CHRONIC COUGH: ICD-10-CM

## 2025-04-30 RX ORDER — FAMOTIDINE 40 MG/1
40 TABLET, FILM COATED ORAL AT BEDTIME
Qty: 30 TABLET | Refills: 3 | Status: SHIPPED | OUTPATIENT
Start: 2025-04-30

## 2025-05-09 ASSESSMENT — ASTHMA QUESTIONNAIRES
QUESTION_1 LAST FOUR WEEKS HOW MUCH OF THE TIME DID YOUR ASTHMA KEEP YOU FROM GETTING AS MUCH DONE AT WORK, SCHOOL OR AT HOME: SOME OF THE TIME
ACT_TOTALSCORE: 13
QUESTION_3 LAST FOUR WEEKS HOW OFTEN DID YOUR ASTHMA SYMPTOMS (WHEEZING, COUGHING, SHORTNESS OF BREATH, CHEST TIGHTNESS OR PAIN) WAKE YOU UP AT NIGHT OR EARLIER THAN USUAL IN THE MORNING: ONCE OR TWICE
QUESTION_5 LAST FOUR WEEKS HOW WOULD YOU RATE YOUR ASTHMA CONTROL: SOMEWHAT CONTROLLED
QUESTION_2 LAST FOUR WEEKS HOW OFTEN HAVE YOU HAD SHORTNESS OF BREATH: MORE THAN ONCE A DAY
QUESTION_4 LAST FOUR WEEKS HOW OFTEN HAVE YOU USED YOUR RESCUE INHALER OR NEBULIZER MEDICATION (SUCH AS ALBUTEROL): ONE OR TWO TIMES PER DAY

## 2025-05-12 ENCOUNTER — OFFICE VISIT (OUTPATIENT)
Dept: PULMONOLOGY | Facility: CLINIC | Age: 65
End: 2025-05-12
Attending: INTERNAL MEDICINE
Payer: COMMERCIAL

## 2025-05-12 VITALS
BODY MASS INDEX: 28.22 KG/M2 | DIASTOLIC BLOOD PRESSURE: 76 MMHG | HEART RATE: 92 BPM | OXYGEN SATURATION: 96 % | WEIGHT: 172.2 LBS | SYSTOLIC BLOOD PRESSURE: 138 MMHG

## 2025-05-12 DIAGNOSIS — J45.41 MODERATE PERSISTENT ASTHMA WITH EXACERBATION: ICD-10-CM

## 2025-05-12 DIAGNOSIS — J39.8 TRACHEOMALACIA: ICD-10-CM

## 2025-05-12 PROCEDURE — 3078F DIAST BP <80 MM HG: CPT | Performed by: INTERNAL MEDICINE

## 2025-05-12 PROCEDURE — 3075F SYST BP GE 130 - 139MM HG: CPT | Performed by: INTERNAL MEDICINE

## 2025-05-12 PROCEDURE — 99214 OFFICE O/P EST MOD 30 MIN: CPT | Performed by: INTERNAL MEDICINE

## 2025-05-12 PROCEDURE — G2211 COMPLEX E/M VISIT ADD ON: HCPCS | Performed by: INTERNAL MEDICINE

## 2025-05-12 RX ORDER — PREDNISONE 10 MG/1
TABLET ORAL
Qty: 12 TABLET | Refills: 0 | Status: SHIPPED | OUTPATIENT
Start: 2025-05-12 | End: 2025-05-20

## 2025-05-12 NOTE — PATIENT INSTRUCTIONS
Symbicort 2 puffs twice a day , use with spacer, rinse your mouth with water after each use  Albuterol HFA or DUONEBS every 6 hours as needed  Continue montelukast 10 mg daily   Avoid eating close to bedtime, at least three hours   Raise the head of the bed  Famotidine at bedtime as needed  Call me if you have any questions , clinic number [phone]  In case of asthma exacerbation, in view of significant hyperglycemia with steroids, plan for : prednisone taper : 20 mg for 5 days then 10 mg for 5 days   Follow up in 6 months

## 2025-06-04 NOTE — PROGRESS NOTES
PULMONARY OUTPATIENT FOLLOW UP NOTE        Assessment:      Severe persistent asthma   Previous lung function test showed a severe obstructive lung disease. Normal diffusion capacity.   Previous labs showed no eosinophilia, normal IgE level 16, mild reactivity to mold, silver birch, marshelder, oak, ragweed.  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   Continue ICS/LABA, albuterol / duonebs as needed.  Add montelukast   Tracheobronchomalacia  Tracheobronchomalacia noted in chest CT scan  Respiratory symptoms have improved with treatment of asthma and acid reflux.   Resolution of previously described lung nodules      Plan:      Symbicort 2 puffs twice a day , use with spacer, rinse your mouth with water after each use  Albuterol HFA or DUONEBS every 6 hours as needed  Continue montelukast 10 mg daily   Avoid eating close to bedtime, at least three hours   Raise the head of the bed  Famotidine at bedtime as needed  Call me if you have any questions , clinic number [phone]  In case of asthma exacerbation, in view of significant hyperglycemia with steroids, plan for : prednisone taper : 20 mg for 5 days then 10 mg for 5 days   Follow up in 6 months         Pedro Bruce  Pulmonary / Critical Care  May 12, 2025       CC:     Chief Complaint   Patient presents with    Follow Up     Moderate persistent asthma without complication  Moderate persistent asthma with exacerbation  Chronic cough  Tracheomalacia        HPI:      Ermias Escamilla is a 65 year old male who presents for follow up.  Patient has history of asthma, tracheobronchomalacia, HTN, DM1, and CKD stage 3.  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   Reports doing well since last visit.   Uses ICS/LABA, albuterol HFA as needed.   Denies fever, chills or night sweats.   Reports postnasal drip, denies headaches, denies sinus tenderness. Uses nasal steroid.  No shortness of breath, no limitations with  activities, occasional wheezes, occasional dry cough.   Denies chest pain, orthopnea, PND, or swelling of LEs  Denies acid reflux symptoms, on PPI daily.   Denies sleep problems, ? Snoring, refresh in AM.   Denies tobacco use.        Past Medical History :     Asthma, tracheobronchomalacia, HTN, DM1, and CKD stage 3     Medications:     Current Outpatient Medications   Medication Sig Dispense Refill    ACCU-CHEK GUIDE test strip 1 strip daily.      albuterol (PROAIR HFA/PROVENTIL HFA/VENTOLIN HFA) 108 (90 Base) MCG/ACT inhaler Inhale 1-2 puffs into the lungs every 6 hours as needed.      amitriptyline (ELAVIL) 10 MG tablet Take 40 mg by mouth at bedtime.      amLODIPine (NORVASC) 5 MG tablet Take 5 mg by mouth daily.      aspirin 81 MG EC tablet Take 1 tablet by mouth daily      budesonide-formoterol (SYMBICORT) 160-4.5 MCG/ACT Inhaler Inhale 2 puffs into the lungs 2 times daily (Patient taking differently: Inhale 2 puffs into the lungs 2 times daily. As needed) 10.2 g 11    cloNIDine (CATAPRES-TTS3) 0.3 MG/24HR WK patch Place 1 patch onto the skin once a week.      famotidine (PEPCID) 40 MG tablet Take 1 tablet (40 mg) by mouth at bedtime. 30 tablet 3    fish oil-omega-3 fatty acids 1000 MG capsule Take 1,000 mg by mouth daily.      fluticasone (FLONASE) 50 MCG/ACT nasal spray Spray 1 spray in nostril. As needed      HUMALOG 100 UNIT/ML injection See Instructions, Instructions: INJECT 35 UNITS DAILY VIA PUMP, # 50 mL, 0 Refill(s), Type: Maintenance, Pharmacy: [facility], INJECT 35 UNITS DAILY VIA PUMP, 67, in, 06/25/23 13:56:00 CDT, Height Measured, 154.6, lb, 07/17/23 14:06:00 CDT, Weight Measured      ipratropium - albuterol 0.5 mg/2.5 mg/3 mL (DUONEB) 0.5-2.5 (3) MG/3ML neb solution Inhale 3 mLs into the lungs every 6 hours as needed.      losartan (COZAAR) 100 MG tablet Take 100 mg by mouth daily.      Multiple Vitamin (MULTI-VITAMINS) TABS Take 1 tablet by mouth daily      simvastatin  (ZOCOR) 20 MG tablet Take 20 mg by mouth      SINGULAIR 10 MG tablet Take 1 tablet (10 mg) by mouth at bedtime 30 tablet 11    SPIRIVA RESPIMAT 2.5 MCG/ACT inhaler Inhale 2 puffs into the lungs daily. As needed      TURMERIC PO Take 1 tablet by mouth daily.      WELLBUTRIN  MG 24 hr tablet Take 150 mg by mouth every morning.       No current facility-administered medications for this visit.     Social History :     Social History     Socioeconomic History    Marital status: Single     Spouse name: Not on file    Number of children: Not on file    Years of education: Not on file    Highest education level: Not on file   Occupational History    Not on file   Tobacco Use    Smoking status: Never     Passive exposure: Past (Mom was a smoker)    Smokeless tobacco: Never   Vaping Use    Vaping status: Never Used   Substance and Sexual Activity    Alcohol use: Not on file    Drug use: Not on file    Sexual activity: Not on file   Other Topics Concern    Not on file   Social History Narrative    Not on file     Social Drivers of Health     Financial Resource Strain: Not on file   Food Insecurity: No Food Insecurity (6/25/2023)    [facility]    Hunger Vital Sign     Worried About Running Out of Food in the Last Year: Never true     Ran Out of Food in the Last Year: Never true   Transportation Needs: Not on file   Physical Activity: Not on file   Stress: Not on file   Social Connections: Not on file   Interpersonal Safety: Not on file   Housing Stability: Not on file          Family History :     No family history on file.    Review of Systems  A 12 point comprehensive review of systems was negative except as noted.        Objective:     /76 (BP Location: Right arm, Patient Position: Sitting, Cuff Size: Adult Regular)   Pulse 92   Wt 78.1 kg (172 lb 3.2 oz)   SpO2 96%   BMI 28.22 kg/m        Gen: awake, alert, no distress  HEENT: pink conjunctiva, moist mucosa, Mallampati II/IV  Neck: no  thyromegaly, masses or JVD  Lungs: wheezes both HT  CV: regular, no murmurs or gallops appreciated  Abdomen: soft, NT, BS wnl  Ext: no edema  Neuro: CN II-XII intact, non focal      Diagnostic tests:       01/05/24 14:13   Allergen A alternata 1.13 (H)   Allergen A fumigatus <0.10   Allergen, [place] Grass <0.10   Allergen C herbarum <0.10   Allergen Cat Dander <0.10   Allergen Cockroach <0.10   Allergen D farinae <0.10   Allergen, D Pteronyssinus <0.10   Allergen Dog Dander <0.10   Allergen Elm <0.10   Allergen Maple <0.10   Allergen, Mtn Jack <0.10   Allergen Oak(white) 0.14 (H)   Allergen P notatum <0.10   Allergen Manjit <0.10   Allergen Tree [place] IgE <0.10   Allergen Weed Nettle IgE <0.10   Allergen [place] <0.10   Allergen Marshelder 0.11 (H)   Allergen, Mouse Urine <0.10   Allergen, Ragweed Short 1.79 (H)   Allergen Russian Thistle <0.10   Allergen, Silver Birch 0.14 (H)   Allergen White Ricardo <0.10   IGE 16        PFTs:     PFTs 12/15/2023    FVC 2.08 L  (58%)  FEV1 1.03 L (36%)  FEV1/FVC 49  DLCO 91%     IMAGES:     XR CHEST 2 VIEWS   LOCATION: [facility] [place]   DATE: 6/25/2023   INDICATION: SOB, leg edema.   COMPARISON: 12/21/2022 CXR and 11/11/2021 CT chest.   IMPRESSION: Strand of fibrosis and/or linear atelectasis left lung base unchanged. Lungs may be mildly hyperinflated. Lungs are otherwise clear. No pleural effusion. Heart size and pulmonary vascularity within normal limits. 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     CT CHEST HI-RESOLUTION WO CONTRAST  LOCATION: [facility]  DATE: 1/5/2024  INDICATION: Severe asthma, hx of tracheobronchomalacia, hx of GRACIELA nodule.  COMPARISON: 11/11/2021.  FINDINGS:   LUNGS AND PLEURA: No groundglass, reticulation or honeycombing. Resolution of prior bilateral opacities and consolidation. Moderate to severe flattening of the trachea and mainstem airways on  expiration. Mild middle lobe bronchiectasis. No pleural effusion.  MEDIASTINUM/AXILLAE: No adenopathy. Nonaneurysmal aorta. Normal heart size.  CORONARY ARTERY CALCIFICATION: Moderate.  UPPER ABDOMEN: No significant finding.  MUSCULOSKELETAL: Degenerative changes spine.                                        IMPRESSION:   1.  Redemonstrated findings compatible with tracheobronchomalacia.  2.  Resolution of prior infectious / inflammatory opacities since 11/11/2021. Resolution of previously new left upper lobe inflammatory nodule.  3.  No significant new findings in the lungs.  4.  No airway thickening. Mild middle lobe bronchiectasis.  5.  No fibrotic interstitial lung disease.